# Patient Record
Sex: FEMALE | Race: BLACK OR AFRICAN AMERICAN | NOT HISPANIC OR LATINO | Employment: OTHER | URBAN - METROPOLITAN AREA
[De-identification: names, ages, dates, MRNs, and addresses within clinical notes are randomized per-mention and may not be internally consistent; named-entity substitution may affect disease eponyms.]

---

## 2020-03-31 ENCOUNTER — OFFICE VISIT (OUTPATIENT)
Dept: FAMILY MEDICINE | Facility: CLINIC | Age: 30
End: 2020-03-31
Payer: COMMERCIAL

## 2020-03-31 VITALS
SYSTOLIC BLOOD PRESSURE: 135 MMHG | HEART RATE: 93 BPM | TEMPERATURE: 99 F | HEIGHT: 64 IN | BODY MASS INDEX: 33.32 KG/M2 | WEIGHT: 195.19 LBS | RESPIRATION RATE: 18 BRPM | DIASTOLIC BLOOD PRESSURE: 85 MMHG | OXYGEN SATURATION: 99 %

## 2020-03-31 DIAGNOSIS — F41.9 ANXIETY AND DEPRESSION: Primary | ICD-10-CM

## 2020-03-31 DIAGNOSIS — F32.A ANXIETY AND DEPRESSION: Primary | ICD-10-CM

## 2020-03-31 DIAGNOSIS — G47.00 INSOMNIA, UNSPECIFIED TYPE: ICD-10-CM

## 2020-03-31 PROCEDURE — 3008F PR BODY MASS INDEX (BMI) DOCUMENTED: ICD-10-PCS | Mod: CPTII,S$GLB,, | Performed by: FAMILY MEDICINE

## 2020-03-31 PROCEDURE — 3008F BODY MASS INDEX DOCD: CPT | Mod: CPTII,S$GLB,, | Performed by: FAMILY MEDICINE

## 2020-03-31 PROCEDURE — 99999 PR PBB SHADOW E&M-EST. PATIENT-LVL III: ICD-10-PCS | Mod: PBBFAC,,, | Performed by: FAMILY MEDICINE

## 2020-03-31 PROCEDURE — 99203 OFFICE O/P NEW LOW 30 MIN: CPT | Mod: S$GLB,,, | Performed by: FAMILY MEDICINE

## 2020-03-31 PROCEDURE — 99203 PR OFFICE/OUTPT VISIT, NEW, LEVL III, 30-44 MIN: ICD-10-PCS | Mod: S$GLB,,, | Performed by: FAMILY MEDICINE

## 2020-03-31 PROCEDURE — 99999 PR PBB SHADOW E&M-EST. PATIENT-LVL III: CPT | Mod: PBBFAC,,, | Performed by: FAMILY MEDICINE

## 2020-03-31 RX ORDER — ESCITALOPRAM OXALATE 10 MG/1
10 TABLET ORAL DAILY
Qty: 30 TABLET | Refills: 2 | Status: SHIPPED | OUTPATIENT
Start: 2020-03-31 | End: 2021-02-09 | Stop reason: SDUPTHER

## 2020-03-31 RX ORDER — TRAZODONE HYDROCHLORIDE 50 MG/1
50 TABLET ORAL NIGHTLY PRN
Qty: 30 TABLET | Refills: 2 | Status: SHIPPED | OUTPATIENT
Start: 2020-03-31 | End: 2021-02-14

## 2020-03-31 NOTE — PROGRESS NOTES
Radames Cota    Chief Complaint   Patient presents with    Anxiety    Panic Attack       History of Present Illness:   Ms. Cota comes in today as a new patient to establish care with me and for evaluation of anxiety.  She states she has been previously followed by PCP Dr. Quiroga.    She states she has been having hard time dealing with anxiety since the end of 2020.  She states she has not been sleeping since then; she states she takes melatonin without help for sleep but also states it occasionally causes her to have headaches..  She reports having chest pain, headaches, tremors, nausea, dizziness, abdominal pain, diarrhea, diaphoresis sometimes with her anxiety.  She states she occasionally cries and does not know why.  She states she is easily agitated with her children.  She states over the past 6 months she states she pulls on her hair uncontrollably.  Otherwise, she denies having depression at this time, homicidal or suicidal thoughts, fever, chills, fatigue, appetite changes; shortness of breath, cough, wheezing; palpitations, leg swelling; vomiting, constipation; unusual urinary symptoms; back pain.  She denies tobacco, alcohol, or illicit drug use.    She states she has been dealing with anxiety since  at which time her 6-week old son  due to SIDS.  She states during that time she had 2-3 sessions of counseling.  She states she was treated with medication for anxiety and depression with help.    She states she has been treated with Trintellix in  and states it made her feel more depressed.  She states she was then tried on Xanax but states she did not like the way it made her feel.  She states she now takes stress relief gummy and uses home remedies without help for anxiety and depression.    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  She states she has 3 children- a 9-year-old son, 2 daughters, ages 7-rear old and 1-year-old.  She is single.    She states she was evaluated at  [facility] on January 19, 2020 for panic attack and states she was told she needs medication.  She had labs drawn at that time with TSH of 2.616 noted.    Past Medical History:  No date: Anxiety  No date: Contraceptive management      Comment:  On Depo Provera - [facility] by [facility]  No date: Depression  No date: Preeclampsia      Comment:  during 3rd and 4th pregnancies      No current outpatient medications on file.       Review of Systems   Constitutional: Negative for activity change, appetite change, chills, fatigue and fever.   Respiratory: Negative for cough, shortness of breath and wheezing.    Cardiovascular: Positive for chest pain and palpitations. Negative for leg swelling.   Gastrointestinal: Positive for abdominal pain, diarrhea and nausea. Negative for blood in stool, constipation and vomiting.   Genitourinary: Negative for difficulty urinating and menstrual problem.   Musculoskeletal: Negative for back pain.   Neurological: Positive for dizziness, light-headedness and headaches. Negative for seizures.   Psychiatric/Behavioral: Positive for agitation, dysphoric mood and sleep disturbance. Negative for hallucinations and suicidal ideas. The patient is nervous/anxious.         Negative for homicidal ideas.       Objective:  Physical Exam   Constitutional: She is oriented to person, place, and time. She appears well-developed and well-nourished. No distress.   Pleasant.   Neck: Normal range of motion. Neck supple. No thyromegaly present.   Cardiovascular: Normal rate, regular rhythm, normal heart sounds and intact distal pulses.   No murmur heard.  Pulmonary/Chest: Effort normal and breath sounds normal. No respiratory distress. She has no wheezes.   Abdominal: Soft. Bowel sounds are normal. She exhibits no distension and no mass. There is no tenderness. There is no guarding.   Musculoskeletal: Normal range of motion. She exhibits no edema or tenderness.   She is ambulatory without  problems.   Lymphadenopathy:     She has no cervical adenopathy.   Neurological: She is alert and oriented to person, place, and time.   Skin: She is not diaphoretic.   Psychiatric: She has a normal mood and affect. Her behavior is normal. Judgment and thought content normal.   Vitals reviewed.      ASSESSMENT:  1. Anxiety and depression    2. Insomnia, unspecified type        PLAN:  Radames was seen today for anxiety and panic attack.    Diagnoses and all orders for this visit:    Anxiety and depression  -     escitalopram oxalate (LEXAPRO) 10 MG tablet; Take 1 tablet (10 mg total) by mouth once daily.    Insomnia, unspecified type  -     traZODone (DESYREL) 50 MG tablet; Take 1 tablet (50 mg total) by mouth nightly as needed for Insomnia or Depression.       Add Lexapro 10 mg every morning; medication precautions discussed with patient.  Try Trazodone 50 mg nightly prn insomnia; medication precautions discussed with patient.  Follow low sodium, low cholesterol, low carb diet, daily walks.  Follow up in about 1 month (around 4/30/2020) for anxiety and depression follow up via virtual visit.   Total visit time 30 minutes with >50% of time spent in counseling and discussion regarding above issues.

## 2020-10-06 ENCOUNTER — PATIENT MESSAGE (OUTPATIENT)
Dept: ADMINISTRATIVE | Facility: HOSPITAL | Age: 30
End: 2020-10-06

## 2021-02-09 ENCOUNTER — PATIENT MESSAGE (OUTPATIENT)
Dept: FAMILY MEDICINE | Facility: CLINIC | Age: 31
End: 2021-02-09

## 2021-02-09 DIAGNOSIS — F32.A ANXIETY AND DEPRESSION: ICD-10-CM

## 2021-02-09 DIAGNOSIS — F41.9 ANXIETY AND DEPRESSION: ICD-10-CM

## 2021-02-10 RX ORDER — ESCITALOPRAM OXALATE 10 MG/1
10 TABLET ORAL DAILY
Qty: 30 TABLET | Refills: 1 | Status: SHIPPED | OUTPATIENT
Start: 2021-02-10 | End: 2021-02-14

## 2021-02-14 ENCOUNTER — PATIENT MESSAGE (OUTPATIENT)
Dept: FAMILY MEDICINE | Facility: CLINIC | Age: 31
End: 2021-02-14

## 2021-02-14 RX ORDER — FLUCONAZOLE 150 MG/1
150 TABLET ORAL ONCE
Qty: 1 TABLET | Refills: 0 | Status: SHIPPED | OUTPATIENT
Start: 2021-02-14 | End: 2021-02-14

## 2021-04-28 ENCOUNTER — PATIENT MESSAGE (OUTPATIENT)
Dept: RESEARCH | Facility: HOSPITAL | Age: 31
End: 2021-04-28

## 2021-05-31 ENCOUNTER — LAB VISIT (OUTPATIENT)
Dept: LAB | Facility: HOSPITAL | Age: 31
End: 2021-05-31
Attending: FAMILY MEDICINE
Payer: COMMERCIAL

## 2021-05-31 ENCOUNTER — OFFICE VISIT (OUTPATIENT)
Dept: INTERNAL MEDICINE | Facility: CLINIC | Age: 31
End: 2021-05-31
Payer: COMMERCIAL

## 2021-05-31 VITALS
BODY MASS INDEX: 34.06 KG/M2 | OXYGEN SATURATION: 98 % | DIASTOLIC BLOOD PRESSURE: 76 MMHG | HEIGHT: 63 IN | SYSTOLIC BLOOD PRESSURE: 124 MMHG | TEMPERATURE: 99 F | WEIGHT: 192.25 LBS | HEART RATE: 108 BPM | RESPIRATION RATE: 18 BRPM

## 2021-05-31 DIAGNOSIS — F32.A ANXIETY AND DEPRESSION: ICD-10-CM

## 2021-05-31 DIAGNOSIS — E66.9 OBESITY (BMI 30.0-34.9): ICD-10-CM

## 2021-05-31 DIAGNOSIS — F41.9 ANXIETY AND DEPRESSION: ICD-10-CM

## 2021-05-31 DIAGNOSIS — Z00.00 ANNUAL PHYSICAL EXAM: Primary | ICD-10-CM

## 2021-05-31 DIAGNOSIS — Z00.00 ANNUAL PHYSICAL EXAM: ICD-10-CM

## 2021-05-31 PROBLEM — E66.811 OBESITY (BMI 30.0-34.9): Status: ACTIVE | Noted: 2021-05-31

## 2021-05-31 LAB
ALBUMIN SERPL BCP-MCNC: 4.2 G/DL (ref 3.5–5.2)
ALP SERPL-CCNC: 57 U/L (ref 55–135)
ALT SERPL W/O P-5'-P-CCNC: 13 U/L (ref 10–44)
ANION GAP SERPL CALC-SCNC: 8 MMOL/L (ref 8–16)
AST SERPL-CCNC: 8 U/L (ref 10–40)
BASOPHILS # BLD AUTO: 0.04 K/UL (ref 0–0.2)
BASOPHILS NFR BLD: 0.6 % (ref 0–1.9)
BILIRUB SERPL-MCNC: 0.4 MG/DL (ref 0.1–1)
BUN SERPL-MCNC: 5 MG/DL (ref 6–20)
CALCIUM SERPL-MCNC: 9.6 MG/DL (ref 8.7–10.5)
CHLORIDE SERPL-SCNC: 105 MMOL/L (ref 95–110)
CHOLEST SERPL-MCNC: 193 MG/DL (ref 120–199)
CHOLEST/HDLC SERPL: 3.4 {RATIO} (ref 2–5)
CO2 SERPL-SCNC: 25 MMOL/L (ref 23–29)
CREAT SERPL-MCNC: 0.8 MG/DL (ref 0.5–1.4)
DIFFERENTIAL METHOD: ABNORMAL
EOSINOPHIL # BLD AUTO: 0.1 K/UL (ref 0–0.5)
EOSINOPHIL NFR BLD: 1.1 % (ref 0–8)
ERYTHROCYTE [DISTWIDTH] IN BLOOD BY AUTOMATED COUNT: 14 % (ref 11.5–14.5)
EST. GFR  (AFRICAN AMERICAN): >60 ML/MIN/1.73 M^2
EST. GFR  (NON AFRICAN AMERICAN): >60 ML/MIN/1.73 M^2
GLUCOSE SERPL-MCNC: 81 MG/DL (ref 70–110)
HCT VFR BLD AUTO: 43.6 % (ref 37–48.5)
HDLC SERPL-MCNC: 57 MG/DL (ref 40–75)
HDLC SERPL: 29.5 % (ref 20–50)
HGB BLD-MCNC: 13.6 G/DL (ref 12–16)
IMM GRANULOCYTES # BLD AUTO: 0.01 K/UL (ref 0–0.04)
IMM GRANULOCYTES NFR BLD AUTO: 0.2 % (ref 0–0.5)
LDLC SERPL CALC-MCNC: 124.4 MG/DL (ref 63–159)
LYMPHOCYTES # BLD AUTO: 2.5 K/UL (ref 1–4.8)
LYMPHOCYTES NFR BLD: 38.7 % (ref 18–48)
MCH RBC QN AUTO: 28.1 PG (ref 27–31)
MCHC RBC AUTO-ENTMCNC: 31.2 G/DL (ref 32–36)
MCV RBC AUTO: 90 FL (ref 82–98)
MONOCYTES # BLD AUTO: 0.7 K/UL (ref 0.3–1)
MONOCYTES NFR BLD: 10.5 % (ref 4–15)
NEUTROPHILS # BLD AUTO: 3.1 K/UL (ref 1.8–7.7)
NEUTROPHILS NFR BLD: 48.9 % (ref 38–73)
NONHDLC SERPL-MCNC: 136 MG/DL
NRBC BLD-RTO: 0 /100 WBC
PLATELET # BLD AUTO: 225 K/UL (ref 150–450)
PMV BLD AUTO: 12.6 FL (ref 9.2–12.9)
POTASSIUM SERPL-SCNC: 3.8 MMOL/L (ref 3.5–5.1)
PROT SERPL-MCNC: 7.8 G/DL (ref 6–8.4)
RBC # BLD AUTO: 4.84 M/UL (ref 4–5.4)
SODIUM SERPL-SCNC: 138 MMOL/L (ref 136–145)
TRIGL SERPL-MCNC: 58 MG/DL (ref 30–150)
TSH SERPL DL<=0.005 MIU/L-ACNC: 0.56 UIU/ML (ref 0.4–4)
WBC # BLD AUTO: 6.36 K/UL (ref 3.9–12.7)

## 2021-05-31 PROCEDURE — 1125F PR PAIN SEVERITY QUANTIFIED, PAIN PRESENT: ICD-10-PCS | Mod: S$GLB,,, | Performed by: FAMILY MEDICINE

## 2021-05-31 PROCEDURE — 84443 ASSAY THYROID STIM HORMONE: CPT | Performed by: FAMILY MEDICINE

## 2021-05-31 PROCEDURE — 99999 PR PBB SHADOW E&M-EST. PATIENT-LVL IV: CPT | Mod: PBBFAC,,, | Performed by: FAMILY MEDICINE

## 2021-05-31 PROCEDURE — 80053 COMPREHEN METABOLIC PANEL: CPT | Performed by: FAMILY MEDICINE

## 2021-05-31 PROCEDURE — 3008F BODY MASS INDEX DOCD: CPT | Mod: CPTII,S$GLB,, | Performed by: FAMILY MEDICINE

## 2021-05-31 PROCEDURE — 86703 HIV-1/HIV-2 1 RESULT ANTBDY: CPT | Performed by: FAMILY MEDICINE

## 2021-05-31 PROCEDURE — 85025 COMPLETE CBC W/AUTO DIFF WBC: CPT | Performed by: FAMILY MEDICINE

## 2021-05-31 PROCEDURE — 3008F PR BODY MASS INDEX (BMI) DOCUMENTED: ICD-10-PCS | Mod: CPTII,S$GLB,, | Performed by: FAMILY MEDICINE

## 2021-05-31 PROCEDURE — 99999 PR PBB SHADOW E&M-EST. PATIENT-LVL IV: ICD-10-PCS | Mod: PBBFAC,,, | Performed by: FAMILY MEDICINE

## 2021-05-31 PROCEDURE — 86803 HEPATITIS C AB TEST: CPT | Performed by: FAMILY MEDICINE

## 2021-05-31 PROCEDURE — 99395 PR PREVENTIVE VISIT,EST,18-39: ICD-10-PCS | Mod: S$GLB,,, | Performed by: FAMILY MEDICINE

## 2021-05-31 PROCEDURE — 1125F AMNT PAIN NOTED PAIN PRSNT: CPT | Mod: S$GLB,,, | Performed by: FAMILY MEDICINE

## 2021-05-31 PROCEDURE — 99395 PREV VISIT EST AGE 18-39: CPT | Mod: S$GLB,,, | Performed by: FAMILY MEDICINE

## 2021-05-31 PROCEDURE — 36415 COLL VENOUS BLD VENIPUNCTURE: CPT | Performed by: FAMILY MEDICINE

## 2021-05-31 PROCEDURE — 80061 LIPID PANEL: CPT | Performed by: FAMILY MEDICINE

## 2021-05-31 RX ORDER — CETIRIZINE HYDROCHLORIDE 10 MG/1
10 TABLET ORAL DAILY PRN
COMMUNITY
Start: 2021-01-20 | End: 2021-12-02

## 2021-05-31 RX ORDER — SPIRONOLACTONE 100 MG/1
100 TABLET, FILM COATED ORAL DAILY
COMMUNITY
Start: 2021-01-07 | End: 2021-12-02

## 2021-05-31 RX ORDER — SPIRONOLACTONE 100 MG/1
100 TABLET, FILM COATED ORAL EVERY MORNING
COMMUNITY
Start: 2021-04-19 | End: 2021-05-31 | Stop reason: SDUPTHER

## 2021-05-31 RX ORDER — ESCITALOPRAM OXALATE 10 MG/1
TABLET ORAL
COMMUNITY
Start: 2021-02-10 | End: 2021-05-31 | Stop reason: SDUPTHER

## 2021-05-31 RX ORDER — ESCITALOPRAM OXALATE 10 MG/1
10 TABLET ORAL DAILY
Qty: 30 TABLET | Refills: 5 | Status: SHIPPED | OUTPATIENT
Start: 2021-05-31 | End: 2021-12-02 | Stop reason: ALTCHOICE

## 2021-06-01 ENCOUNTER — IMMUNIZATION (OUTPATIENT)
Dept: INTERNAL MEDICINE | Facility: CLINIC | Age: 31
End: 2021-06-01
Payer: MEDICARE

## 2021-06-01 DIAGNOSIS — Z23 NEED FOR VACCINATION: Primary | ICD-10-CM

## 2021-06-01 LAB
HCV AB SERPL QL IA: NEGATIVE
HIV 1+2 AB+HIV1 P24 AG SERPL QL IA: NEGATIVE

## 2021-06-01 PROCEDURE — 91300 COVID-19, MRNA, LNP-S, PF, 30 MCG/0.3 ML DOSE VACCINE: CPT | Mod: PBBFAC | Performed by: FAMILY MEDICINE

## 2021-06-02 ENCOUNTER — PATIENT OUTREACH (OUTPATIENT)
Dept: ADMINISTRATIVE | Facility: HOSPITAL | Age: 31
End: 2021-06-02

## 2021-07-01 ENCOUNTER — OFFICE VISIT (OUTPATIENT)
Dept: OPHTHALMOLOGY | Facility: CLINIC | Age: 31
End: 2021-07-01
Payer: MEDICARE

## 2021-07-01 DIAGNOSIS — H52.7 REFRACTIVE ERRORS: ICD-10-CM

## 2021-07-01 DIAGNOSIS — Z01.00 ENCOUNTER FOR EYE EXAM: Primary | ICD-10-CM

## 2021-07-01 PROCEDURE — 92004 PR EYE EXAM, NEW PATIENT,COMPREHESV: ICD-10-PCS | Mod: S$GLB,,, | Performed by: OPTOMETRIST

## 2021-07-01 PROCEDURE — 99999 PR PBB SHADOW E&M-EST. PATIENT-LVL II: CPT | Mod: PBBFAC,,, | Performed by: OPTOMETRIST

## 2021-07-01 PROCEDURE — 92004 COMPRE OPH EXAM NEW PT 1/>: CPT | Mod: S$GLB,,, | Performed by: OPTOMETRIST

## 2021-07-01 PROCEDURE — 92015 PR REFRACTION: ICD-10-PCS | Mod: S$GLB,,, | Performed by: OPTOMETRIST

## 2021-07-01 PROCEDURE — 1126F PR PAIN SEVERITY QUANTIFIED, NO PAIN PRESENT: ICD-10-PCS | Mod: S$GLB,,, | Performed by: OPTOMETRIST

## 2021-07-01 PROCEDURE — 1126F AMNT PAIN NOTED NONE PRSNT: CPT | Mod: S$GLB,,, | Performed by: OPTOMETRIST

## 2021-07-01 PROCEDURE — 92015 DETERMINE REFRACTIVE STATE: CPT | Mod: S$GLB,,, | Performed by: OPTOMETRIST

## 2021-07-01 PROCEDURE — 99999 PR PBB SHADOW E&M-EST. PATIENT-LVL II: ICD-10-PCS | Mod: PBBFAC,,, | Performed by: OPTOMETRIST

## 2021-07-02 ENCOUNTER — IMMUNIZATION (OUTPATIENT)
Dept: INTERNAL MEDICINE | Facility: CLINIC | Age: 31
End: 2021-07-02
Payer: MEDICARE

## 2021-07-02 DIAGNOSIS — Z23 NEED FOR VACCINATION: Primary | ICD-10-CM

## 2021-07-02 PROCEDURE — 0002A COVID-19, MRNA, LNP-S, PF, 30 MCG/0.3 ML DOSE VACCINE: CPT | Mod: PBBFAC | Performed by: FAMILY MEDICINE

## 2021-07-02 PROCEDURE — 91300 COVID-19, MRNA, LNP-S, PF, 30 MCG/0.3 ML DOSE VACCINE: CPT | Mod: PBBFAC | Performed by: FAMILY MEDICINE

## 2021-11-17 ENCOUNTER — PATIENT MESSAGE (OUTPATIENT)
Dept: FAMILY MEDICINE | Facility: CLINIC | Age: 31
End: 2021-11-17
Payer: MEDICAID

## 2021-11-17 ENCOUNTER — TELEPHONE (OUTPATIENT)
Dept: FAMILY MEDICINE | Facility: CLINIC | Age: 31
End: 2021-11-17
Payer: MEDICAID

## 2021-11-17 DIAGNOSIS — B37.9 YEAST INFECTION: Primary | ICD-10-CM

## 2021-11-17 RX ORDER — FLUCONAZOLE 150 MG/1
150 TABLET ORAL DAILY
Qty: 1 TABLET | Refills: 0 | Status: SHIPPED | OUTPATIENT
Start: 2021-11-17 | End: 2021-11-18

## 2021-12-02 ENCOUNTER — OFFICE VISIT (OUTPATIENT)
Dept: FAMILY MEDICINE | Facility: CLINIC | Age: 31
End: 2021-12-02
Payer: MEDICARE

## 2021-12-02 VITALS
HEART RATE: 80 BPM | DIASTOLIC BLOOD PRESSURE: 62 MMHG | BODY MASS INDEX: 34.45 KG/M2 | SYSTOLIC BLOOD PRESSURE: 116 MMHG | WEIGHT: 194.44 LBS | HEIGHT: 63 IN | TEMPERATURE: 99 F | OXYGEN SATURATION: 98 %

## 2021-12-02 DIAGNOSIS — F41.9 ANXIETY AND DEPRESSION: Primary | ICD-10-CM

## 2021-12-02 DIAGNOSIS — F32.A ANXIETY AND DEPRESSION: Primary | ICD-10-CM

## 2021-12-02 PROCEDURE — 99999 PR PBB SHADOW E&M-EST. PATIENT-LVL III: CPT | Mod: PBBFAC,,, | Performed by: FAMILY MEDICINE

## 2021-12-02 PROCEDURE — 99213 PR OFFICE/OUTPT VISIT, EST, LEVL III, 20-29 MIN: ICD-10-PCS | Mod: S$GLB,,, | Performed by: FAMILY MEDICINE

## 2021-12-02 PROCEDURE — 99213 OFFICE O/P EST LOW 20 MIN: CPT | Mod: S$GLB,,, | Performed by: FAMILY MEDICINE

## 2021-12-02 PROCEDURE — 99999 PR PBB SHADOW E&M-EST. PATIENT-LVL III: ICD-10-PCS | Mod: PBBFAC,,, | Performed by: FAMILY MEDICINE

## 2021-12-02 RX ORDER — VENLAFAXINE HYDROCHLORIDE 75 MG/1
75 CAPSULE, EXTENDED RELEASE ORAL DAILY
Qty: 30 CAPSULE | Refills: 5 | Status: SHIPPED | OUTPATIENT
Start: 2021-12-02 | End: 2022-04-26 | Stop reason: DRUGHIGH

## 2021-12-02 RX ORDER — VENLAFAXINE HYDROCHLORIDE 37.5 MG/1
37.5 CAPSULE, EXTENDED RELEASE ORAL DAILY
Qty: 7 CAPSULE | Refills: 0 | Status: SHIPPED | OUTPATIENT
Start: 2021-12-02 | End: 2021-12-09

## 2021-12-27 ENCOUNTER — TELEPHONE (OUTPATIENT)
Dept: FAMILY MEDICINE | Facility: CLINIC | Age: 31
End: 2021-12-27
Payer: MEDICAID

## 2022-01-13 ENCOUNTER — PATIENT MESSAGE (OUTPATIENT)
Dept: FAMILY MEDICINE | Facility: CLINIC | Age: 32
End: 2022-01-13
Payer: MEDICAID

## 2022-01-13 DIAGNOSIS — F32.A ANXIETY AND DEPRESSION: Primary | ICD-10-CM

## 2022-01-13 DIAGNOSIS — F41.9 ANXIETY AND DEPRESSION: Primary | ICD-10-CM

## 2022-01-21 NOTE — TELEPHONE ENCOUNTER
Referral in for [facility]counsel[facility]. Advise patient. Thanks.      Orders Placed This Encounter   Procedures    Ambulatory referral/consult to Psychology     Standing Status:   Future     Standing Expiration Date:   2/21/2023     Referral Priority:   Routine     Referral Type:   Psychiatric     Referral Reason:   Specialty Services Required     Requested Specialty:   Psychology     Number of Visits Requested:   1

## 2022-04-17 ENCOUNTER — PATIENT MESSAGE (OUTPATIENT)
Dept: FAMILY MEDICINE | Facility: CLINIC | Age: 32
End: 2022-04-17
Payer: MEDICAID

## 2022-04-25 ENCOUNTER — TELEPHONE (OUTPATIENT)
Dept: FAMILY MEDICINE | Facility: CLINIC | Age: 32
End: 2022-04-25
Payer: MEDICAID

## 2022-04-25 ENCOUNTER — PATIENT MESSAGE (OUTPATIENT)
Dept: FAMILY MEDICINE | Facility: CLINIC | Age: 32
End: 2022-04-25
Payer: MEDICAID

## 2022-04-26 ENCOUNTER — OFFICE VISIT (OUTPATIENT)
Dept: PSYCHIATRY | Facility: CLINIC | Age: 32
End: 2022-04-26
Payer: MEDICAID

## 2022-04-26 ENCOUNTER — TELEPHONE (OUTPATIENT)
Dept: FAMILY MEDICINE | Facility: CLINIC | Age: 32
End: 2022-04-26
Payer: MEDICAID

## 2022-04-26 ENCOUNTER — PATIENT MESSAGE (OUTPATIENT)
Dept: FAMILY MEDICINE | Facility: CLINIC | Age: 32
End: 2022-04-26
Payer: MEDICAID

## 2022-04-26 DIAGNOSIS — F41.0 PANIC DISORDER: ICD-10-CM

## 2022-04-26 DIAGNOSIS — F32.2 CURRENT SEVERE EPISODE OF MAJOR DEPRESSIVE DISORDER WITHOUT PSYCHOTIC FEATURES WITHOUT PRIOR EPISODE: Primary | ICD-10-CM

## 2022-04-26 DIAGNOSIS — F43.10 PTSD (POST-TRAUMATIC STRESS DISORDER): ICD-10-CM

## 2022-04-26 DIAGNOSIS — F41.9 ANXIETY AND DEPRESSION: Primary | ICD-10-CM

## 2022-04-26 DIAGNOSIS — F32.A ANXIETY AND DEPRESSION: Primary | ICD-10-CM

## 2022-04-26 PROCEDURE — 90791 PR PSYCHIATRIC DIAGNOSTIC EVALUATION: ICD-10-PCS | Mod: AJ,HB,, | Performed by: SOCIAL WORKER

## 2022-04-26 PROCEDURE — 99999 PR PBB SHADOW E&M-EST. PATIENT-LVL II: CPT | Mod: PBBFAC,AJ,HB, | Performed by: SOCIAL WORKER

## 2022-04-26 PROCEDURE — 90791 PSYCH DIAGNOSTIC EVALUATION: CPT | Mod: AJ,HB,, | Performed by: SOCIAL WORKER

## 2022-04-26 PROCEDURE — 99212 OFFICE O/P EST SF 10 MIN: CPT | Mod: PBBFAC | Performed by: SOCIAL WORKER

## 2022-04-26 PROCEDURE — 99999 PR PBB SHADOW E&M-EST. PATIENT-LVL II: ICD-10-PCS | Mod: PBBFAC,AJ,HB, | Performed by: SOCIAL WORKER

## 2022-04-26 PROCEDURE — 1159F PR MEDICATION LIST DOCUMENTED IN MEDICAL RECORD: ICD-10-PCS | Mod: AJ,HB,CPTII, | Performed by: SOCIAL WORKER

## 2022-04-26 PROCEDURE — 1159F MED LIST DOCD IN RCRD: CPT | Mod: AJ,HB,CPTII, | Performed by: SOCIAL WORKER

## 2022-04-26 RX ORDER — VENLAFAXINE HYDROCHLORIDE 150 MG/1
150 CAPSULE, EXTENDED RELEASE ORAL DAILY
Qty: 30 CAPSULE | Refills: 0 | Status: SHIPPED | OUTPATIENT
Start: 2022-04-26 | End: 2022-05-31 | Stop reason: SDUPTHER

## 2022-04-26 NOTE — TELEPHONE ENCOUNTER
"Pt portal message    "    Hello,  Today I met with the counselor that you referred me to. After talking with her, she wanted me to ask you about an increase in the dosage of my current medication and if that was something you would approve of.     Thanks in Advance!  Pt would like increase on effexor  Please advise  "
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
## 2022-05-02 ENCOUNTER — PATIENT MESSAGE (OUTPATIENT)
Dept: ADMINISTRATIVE | Facility: HOSPITAL | Age: 32
End: 2022-05-02
Payer: MEDICAID

## 2022-05-03 ENCOUNTER — PATIENT MESSAGE (OUTPATIENT)
Dept: ADMINISTRATIVE | Facility: HOSPITAL | Age: 32
End: 2022-05-03
Payer: MEDICAID

## 2022-05-31 ENCOUNTER — OFFICE VISIT (OUTPATIENT)
Dept: FAMILY MEDICINE | Facility: CLINIC | Age: 32
End: 2022-05-31
Payer: MEDICAID

## 2022-05-31 VITALS
BODY MASS INDEX: 37.11 KG/M2 | WEIGHT: 209.44 LBS | DIASTOLIC BLOOD PRESSURE: 60 MMHG | HEIGHT: 63 IN | SYSTOLIC BLOOD PRESSURE: 120 MMHG | HEART RATE: 100 BPM | OXYGEN SATURATION: 99 % | TEMPERATURE: 97 F

## 2022-05-31 DIAGNOSIS — F41.9 ANXIETY AND DEPRESSION: ICD-10-CM

## 2022-05-31 DIAGNOSIS — F32.A ANXIETY AND DEPRESSION: ICD-10-CM

## 2022-05-31 DIAGNOSIS — Z00.00 ANNUAL PHYSICAL EXAM: Primary | ICD-10-CM

## 2022-05-31 DIAGNOSIS — E66.01 SEVERE OBESITY (BMI 35.0-39.9) WITH COMORBIDITY: ICD-10-CM

## 2022-05-31 PROCEDURE — 99999 PR PBB SHADOW E&M-EST. PATIENT-LVL IV: CPT | Mod: PBBFAC,,, | Performed by: FAMILY MEDICINE

## 2022-05-31 PROCEDURE — 1159F MED LIST DOCD IN RCRD: CPT | Mod: CPTII,,, | Performed by: FAMILY MEDICINE

## 2022-05-31 PROCEDURE — 3078F PR MOST RECENT DIASTOLIC BLOOD PRESSURE < 80 MM HG: ICD-10-PCS | Mod: CPTII,,, | Performed by: FAMILY MEDICINE

## 2022-05-31 PROCEDURE — 3074F SYST BP LT 130 MM HG: CPT | Mod: CPTII,,, | Performed by: FAMILY MEDICINE

## 2022-05-31 PROCEDURE — 1159F PR MEDICATION LIST DOCUMENTED IN MEDICAL RECORD: ICD-10-PCS | Mod: CPTII,,, | Performed by: FAMILY MEDICINE

## 2022-05-31 PROCEDURE — 3008F PR BODY MASS INDEX (BMI) DOCUMENTED: ICD-10-PCS | Mod: CPTII,,, | Performed by: FAMILY MEDICINE

## 2022-05-31 PROCEDURE — 99999 PR PBB SHADOW E&M-EST. PATIENT-LVL IV: ICD-10-PCS | Mod: PBBFAC,,, | Performed by: FAMILY MEDICINE

## 2022-05-31 PROCEDURE — 99395 PREV VISIT EST AGE 18-39: CPT | Mod: S$PBB,,, | Performed by: FAMILY MEDICINE

## 2022-05-31 PROCEDURE — 1160F RVW MEDS BY RX/DR IN RCRD: CPT | Mod: CPTII,,, | Performed by: FAMILY MEDICINE

## 2022-05-31 PROCEDURE — 1160F PR REVIEW ALL MEDS BY PRESCRIBER/CLIN PHARMACIST DOCUMENTED: ICD-10-PCS | Mod: CPTII,,, | Performed by: FAMILY MEDICINE

## 2022-05-31 PROCEDURE — 99395 PR PREVENTIVE VISIT,EST,18-39: ICD-10-PCS | Mod: S$PBB,,, | Performed by: FAMILY MEDICINE

## 2022-05-31 PROCEDURE — 3008F BODY MASS INDEX DOCD: CPT | Mod: CPTII,,, | Performed by: FAMILY MEDICINE

## 2022-05-31 PROCEDURE — 3078F DIAST BP <80 MM HG: CPT | Mod: CPTII,,, | Performed by: FAMILY MEDICINE

## 2022-05-31 PROCEDURE — 3074F PR MOST RECENT SYSTOLIC BLOOD PRESSURE < 130 MM HG: ICD-10-PCS | Mod: CPTII,,, | Performed by: FAMILY MEDICINE

## 2022-05-31 PROCEDURE — 99214 OFFICE O/P EST MOD 30 MIN: CPT | Mod: PBBFAC,PO | Performed by: FAMILY MEDICINE

## 2022-05-31 RX ORDER — VENLAFAXINE HYDROCHLORIDE 150 MG/1
150 CAPSULE, EXTENDED RELEASE ORAL DAILY
Qty: 90 CAPSULE | Refills: 1 | Status: SHIPPED | OUTPATIENT
Start: 2022-05-31 | End: 2022-10-19 | Stop reason: ALTCHOICE

## 2022-05-31 NOTE — PROGRESS NOTES
HISTORY OF PRESENT ILLNESS:  Ms. Cota comes in today for annual wellness examination.  She is not fasting and has taken medications today.     END OF LIFE DECISION: She does not have a living will. She does desire life support.    .  Current Outpatient Medications   Medication Sig    venlafaxine (EFFEXOR-XR) 150 MG Cp24 Take 1 capsule (150 mg total) by mouth once daily.      SCREENINGS:    LDLCALC: May 31, 2021.   Colonoscopy: Never.  Mammogram: Never.  Eye Exam: July 1, 2021 with Dr. Kaur. She wears glasses.  GYN Exam:  [facility] with [facility] - okay per patient. Scheduled with [facility] on September 26, 2022.  PPD: Never.        Immunization History   Administered Date(s) Administered    COVID-19, MRNA, LN-S, PF (Pfizer) (Purple Cap) 06/01/2021, 07/02/2021    MMR 02/14/1994, 12/12/1996    Meningococcal Conjugate (MCV4P) 03/18/2010    Tdap 03/18/2010, 03/05/2015, 07/30/2018   Flu shot: In the past per patient.                                            ROS:  GENERAL: Denies fever, chills, fatigue or unusual weight change. Appetite normal. Exercises does not. Monitors diet does not. Weight 88.2 kg (194 lb 7.1 oz) at December 2, 2021 visit  SKIN: Denies rashes, itching, changes in mole, color or texture of skin or easy bruising.  HEAD:  Denies recent head trauma or headaches.  EYES: Denies change in vision, pain, diplopia, redness or discharge.  EARS: Denies ear pain, discharge, vertigo or decreased hearing.  NOSE: Denies loss of smell, epistaxis or rhinitis.  MOUTH & THROAT: Denies hoarseness or change in voice. Denies excessive gum bleeding or mouth sores.  Denies sore throat.  NODES: Denies swollen glands.  CHEST: Denies MCQUEEN, wheezing, cough, or sputum production.  CARDIOVASCULAR: Denies chest pain, PND, orthopnea or reduced exercise tolerance.  Denies palpitations.  ABDOMEN: Denies diarrhea, constipation, nausea, vomiting, abdominal pain, or blood in stool.  URINARY: Denies  "flank pain, dysuria or hematuria.  GENITOURINARY: Denies flank pain, dysuria, frequency or hematuria. No change in menses. Performs monthly breast exams does not.  ENDOCRINE: Denies diabetes, thyroid, cholesterol problems. Performs home glucose checks N./A. Reports sweats and eats a lot for few weeks.  HEME/LYMPH: Denies bleeding problems.  PERIPHERAL VASCULAR:Denies claudication or cyanosis.  MUSCULOSKELETAL: Denies joint stiffness, pain or swelling. Denies edema. Reports previous PCP advised she has CTS and cubital tunnel syndrome with treatment with steroid. Reports no symptoms unless overuse of hands.   NEUROLOGIC: Denies history of seizures, tremors, paralysis, alteration of gait or coordination.  PSYCHIATRIC: Denies mood swings, controlled anxiety and depression with taking Effexor-XR, homicidal or suicidal thoughts. Follows with Lexi Ace LCSW with last visit on April 26, 2022 with follow up scheduled for June 7, 2022 and also with Dr. Gutierrez on June 29, 2022. Denies sleep problems.     PE:   VS: Blood Pressure 120/60   Pulse 100   Temperature 96.5 °F (35.8 °C)   Height 5' 3" (1.6 m)   Weight 95 kg (209 lb 7 oz)   Last Menstrual Period 05/31/2022 Comment: monthly  Oxygen Saturation 99%   Body Mass Index 37.10 kg/m²   APPEARANCE:  Well nourished, well developed female, pleasant and obese, alert and oriented in no acute distress.    HEAD: Nontender. Full range of motion.  EYES: PERRL, conjunctiva pink, lids no edema.   EARS: External canal patent, no swelling or redness. TM's shiny and clear.  NOSE: Mucosa and turbinates pink, not swollen. No discharge. Nontender sinuses.  THROAT: No pharyngeal erythema or exudate. No stridor.   NECK: Supple, no mass, thyroid not enlarged.  NODES: No cervical lymph node enlargement.  CHEST: Normal respiratory effort. Lungs clear to auscultation.  CARDIOVASCULAR: Normal S1, S2. No rubs, murmurs or gallops. PMI not displaced. No carotid bruit. Pedal pulses " palpable bilaterally. No edema.  ABDOMEN: Bowel sounds present. Not distended. Soft. No tenderness, masses or organomegaly.  BREAST EXAM: Not examined.  PELVIC EXAM: Not examined.  RECTAL EXAM: Not examined.  MUSCULOSKELETAL: No joint deformities or stiffness. She is ambulatory without problems.  SKIN: No rashes or suspicious lesions, normal color and turgor.  NEUROLOGIC:   Cranial Nerves: II-XII grossly intact.   DTR's: Knees, Ankles 2+ and equal bilaterally. Gait & Posture: Normal gait and fine motion.  PSYCHIATRIC:Patient alert, oriented x 3. Mood/Affect normal without acute anxiety or depression noted. Judgment/insight good as she makes appropriate decisions during today's exam.    ASSESSMENT:    ICD-10-CM ICD-9-CM    1. Annual physical exam  Z00.00 V70.0 Hemoglobin A1C      CBC Auto Differential      TSH      Comprehensive Metabolic Panel      Lipid Panel   2. Anxiety and depression  F41.9 300.00 venlafaxine (EFFEXOR-XR) 150 MG Cp24    F32.A 311    3. Severe obesity (BMI 35.0-39.9) with comorbidity  E66.01 278.01        PLAN:  1. Age-appropriate counseling-appropriate low-sodium, low-cholesterol, low carbohydrate diet and exercise daily, monthly breast self exam, annual wellness examination.   2. Patient advised to call for results.  3. Continue current medications.  4. Prescription refill as noted above.  5. Keep follow up with specialists.  6. Follow up in about 6 months (around 11/30/2022) for anxiety and depression follow up.

## 2022-06-01 PROBLEM — E66.01 SEVERE OBESITY (BMI 35.0-39.9) WITH COMORBIDITY: Status: ACTIVE | Noted: 2022-06-01

## 2022-06-03 ENCOUNTER — LAB VISIT (OUTPATIENT)
Dept: LAB | Facility: HOSPITAL | Age: 32
End: 2022-06-03
Attending: FAMILY MEDICINE
Payer: MEDICAID

## 2022-06-03 DIAGNOSIS — Z00.00 ANNUAL PHYSICAL EXAM: ICD-10-CM

## 2022-06-03 LAB
ALBUMIN SERPL BCP-MCNC: 4.2 G/DL (ref 3.5–5.2)
ALP SERPL-CCNC: 56 U/L (ref 55–135)
ALT SERPL W/O P-5'-P-CCNC: 16 U/L (ref 10–44)
ANION GAP SERPL CALC-SCNC: 14 MMOL/L (ref 8–16)
AST SERPL-CCNC: 11 U/L (ref 10–40)
BASOPHILS # BLD AUTO: 0.05 K/UL (ref 0–0.2)
BASOPHILS NFR BLD: 0.9 % (ref 0–1.9)
BILIRUB SERPL-MCNC: 0.5 MG/DL (ref 0.1–1)
BUN SERPL-MCNC: 5 MG/DL (ref 6–20)
CALCIUM SERPL-MCNC: 9.3 MG/DL (ref 8.7–10.5)
CHLORIDE SERPL-SCNC: 106 MMOL/L (ref 95–110)
CHOLEST SERPL-MCNC: 211 MG/DL (ref 120–199)
CHOLEST/HDLC SERPL: 4.1 {RATIO} (ref 2–5)
CO2 SERPL-SCNC: 22 MMOL/L (ref 23–29)
CREAT SERPL-MCNC: 0.8 MG/DL (ref 0.5–1.4)
DIFFERENTIAL METHOD: ABNORMAL
EOSINOPHIL # BLD AUTO: 0.1 K/UL (ref 0–0.5)
EOSINOPHIL NFR BLD: 0.9 % (ref 0–8)
ERYTHROCYTE [DISTWIDTH] IN BLOOD BY AUTOMATED COUNT: 14.3 % (ref 11.5–14.5)
EST. GFR  (AFRICAN AMERICAN): >60 ML/MIN/1.73 M^2
EST. GFR  (NON AFRICAN AMERICAN): >60 ML/MIN/1.73 M^2
ESTIMATED AVG GLUCOSE: 105 MG/DL (ref 68–131)
GLUCOSE SERPL-MCNC: 78 MG/DL (ref 70–110)
HBA1C MFR BLD: 5.3 % (ref 4–5.6)
HCT VFR BLD AUTO: 45 % (ref 37–48.5)
HDLC SERPL-MCNC: 52 MG/DL (ref 40–75)
HDLC SERPL: 24.6 % (ref 20–50)
HGB BLD-MCNC: 14.2 G/DL (ref 12–16)
IMM GRANULOCYTES # BLD AUTO: 0.01 K/UL (ref 0–0.04)
IMM GRANULOCYTES NFR BLD AUTO: 0.2 % (ref 0–0.5)
LDLC SERPL CALC-MCNC: 147.4 MG/DL (ref 63–159)
LYMPHOCYTES # BLD AUTO: 2.5 K/UL (ref 1–4.8)
LYMPHOCYTES NFR BLD: 41.9 % (ref 18–48)
MCH RBC QN AUTO: 28.2 PG (ref 27–31)
MCHC RBC AUTO-ENTMCNC: 31.6 G/DL (ref 32–36)
MCV RBC AUTO: 89 FL (ref 82–98)
MONOCYTES # BLD AUTO: 0.5 K/UL (ref 0.3–1)
MONOCYTES NFR BLD: 8.5 % (ref 4–15)
NEUTROPHILS # BLD AUTO: 2.8 K/UL (ref 1.8–7.7)
NEUTROPHILS NFR BLD: 47.6 % (ref 38–73)
NONHDLC SERPL-MCNC: 159 MG/DL
NRBC BLD-RTO: 0 /100 WBC
PLATELET # BLD AUTO: 254 K/UL (ref 150–450)
PMV BLD AUTO: 12.5 FL (ref 9.2–12.9)
POTASSIUM SERPL-SCNC: 4.4 MMOL/L (ref 3.5–5.1)
PROT SERPL-MCNC: 7.8 G/DL (ref 6–8.4)
RBC # BLD AUTO: 5.04 M/UL (ref 4–5.4)
SODIUM SERPL-SCNC: 142 MMOL/L (ref 136–145)
TRIGL SERPL-MCNC: 58 MG/DL (ref 30–150)
TSH SERPL DL<=0.005 MIU/L-ACNC: 0.88 UIU/ML (ref 0.4–4)
WBC # BLD AUTO: 5.87 K/UL (ref 3.9–12.7)

## 2022-06-03 PROCEDURE — 80061 LIPID PANEL: CPT | Performed by: FAMILY MEDICINE

## 2022-06-03 PROCEDURE — 84443 ASSAY THYROID STIM HORMONE: CPT | Performed by: FAMILY MEDICINE

## 2022-06-03 PROCEDURE — 85025 COMPLETE CBC W/AUTO DIFF WBC: CPT | Performed by: FAMILY MEDICINE

## 2022-06-03 PROCEDURE — 36415 COLL VENOUS BLD VENIPUNCTURE: CPT | Mod: PO | Performed by: FAMILY MEDICINE

## 2022-06-03 PROCEDURE — 80053 COMPREHEN METABOLIC PANEL: CPT | Performed by: FAMILY MEDICINE

## 2022-06-03 PROCEDURE — 83036 HEMOGLOBIN GLYCOSYLATED A1C: CPT | Performed by: FAMILY MEDICINE

## 2022-07-20 ENCOUNTER — PATIENT MESSAGE (OUTPATIENT)
Dept: FAMILY MEDICINE | Facility: CLINIC | Age: 32
End: 2022-07-20
Payer: MEDICAID

## 2022-07-21 ENCOUNTER — TELEPHONE (OUTPATIENT)
Dept: FAMILY MEDICINE | Facility: CLINIC | Age: 32
End: 2022-07-21
Payer: MEDICAID

## 2022-07-21 DIAGNOSIS — M79.643 PAIN OF HAND, UNSPECIFIED LATERALITY: Primary | ICD-10-CM

## 2022-07-21 NOTE — TELEPHONE ENCOUNTER
"Pt portal message    "Hello!     Mary been experiencing some problems with my carpal tunnel the past two weeks. Today I woke up to my right hand being extremely swollen, pained  and numb. I took pictures of it also. Mary tried ice, ibuprofen, etc and nothing is helping.  Thanks!"    Please advise and view picture attached in portal  "

## 2022-07-25 NOTE — TELEPHONE ENCOUNTER
Please check on patient to see how symptoms are now with taking medication for several days. Thanks.

## 2022-07-25 NOTE — TELEPHONE ENCOUNTER
The pt states the swelling has went down but she still have the numbing and tingling feelings in your finger tips.

## 2022-07-28 RX ORDER — METHYLPREDNISOLONE 4 MG/1
TABLET ORAL
Qty: 1 EACH | Refills: 0 | Status: SHIPPED | OUTPATIENT
Start: 2022-07-28 | End: 2022-09-26

## 2022-07-28 NOTE — TELEPHONE ENCOUNTER
I don't diagnoses of carpal tunnel syndrome on chart; however, if not pregnant, advise patient can try steroid dose pack to see if helps.  If not helpful, need appt. Thanks.    I have put the following orders and/or medications to this note.  Please advise pt and assist.    No orders of the defined types were placed in this encounter.      Medications Ordered This Encounter   Medications    methylPREDNISolone (MEDROL DOSEPACK) 4 mg tablet     Sig: use as directed     Dispense:  1 each     Refill:  0

## 2022-09-26 ENCOUNTER — OFFICE VISIT (OUTPATIENT)
Dept: OBSTETRICS AND GYNECOLOGY | Facility: CLINIC | Age: 32
End: 2022-09-26
Payer: MEDICAID

## 2022-09-26 ENCOUNTER — LAB VISIT (OUTPATIENT)
Dept: LAB | Facility: HOSPITAL | Age: 32
End: 2022-09-26
Attending: NURSE PRACTITIONER
Payer: MEDICAID

## 2022-09-26 VITALS
DIASTOLIC BLOOD PRESSURE: 78 MMHG | HEIGHT: 63 IN | SYSTOLIC BLOOD PRESSURE: 110 MMHG | BODY MASS INDEX: 38.98 KG/M2 | WEIGHT: 220 LBS

## 2022-09-26 DIAGNOSIS — N92.6 MISSED MENSES: Primary | ICD-10-CM

## 2022-09-26 DIAGNOSIS — N92.6 MISSED MENSES: ICD-10-CM

## 2022-09-26 DIAGNOSIS — Z11.3 SCREEN FOR STD (SEXUALLY TRANSMITTED DISEASE): ICD-10-CM

## 2022-09-26 DIAGNOSIS — Z32.00 POSSIBLE PREGNANCY: ICD-10-CM

## 2022-09-26 DIAGNOSIS — Z12.4 PAPANICOLAOU SMEAR FOR CERVICAL CANCER SCREENING: ICD-10-CM

## 2022-09-26 LAB
B-HCG UR QL: NEGATIVE
CTP QC/QA: YES
HAV IGM SERPL QL IA: NORMAL
HBV CORE IGM SERPL QL IA: NORMAL
HBV SURFACE AG SERPL QL IA: NORMAL
HCV AB SERPL QL IA: NORMAL
HIV 1+2 AB+HIV1 P24 AG SERPL QL IA: NORMAL
PROLACTIN SERPL IA-MCNC: 19.2 NG/ML (ref 5.2–26.5)

## 2022-09-26 PROCEDURE — 99213 OFFICE O/P EST LOW 20 MIN: CPT | Mod: PBBFAC | Performed by: NURSE PRACTITIONER

## 2022-09-26 PROCEDURE — 3008F PR BODY MASS INDEX (BMI) DOCUMENTED: ICD-10-PCS | Mod: CPTII,,, | Performed by: NURSE PRACTITIONER

## 2022-09-26 PROCEDURE — 3078F PR MOST RECENT DIASTOLIC BLOOD PRESSURE < 80 MM HG: ICD-10-PCS | Mod: CPTII,,, | Performed by: NURSE PRACTITIONER

## 2022-09-26 PROCEDURE — 87389 HIV-1 AG W/HIV-1&-2 AB AG IA: CPT | Performed by: NURSE PRACTITIONER

## 2022-09-26 PROCEDURE — 88175 CYTOPATH C/V AUTO FLUID REDO: CPT | Performed by: NURSE PRACTITIONER

## 2022-09-26 PROCEDURE — 99999 PR PBB SHADOW E&M-EST. PATIENT-LVL III: CPT | Mod: PBBFAC,,, | Performed by: NURSE PRACTITIONER

## 2022-09-26 PROCEDURE — 86592 SYPHILIS TEST NON-TREP QUAL: CPT | Performed by: NURSE PRACTITIONER

## 2022-09-26 PROCEDURE — 87491 CHLMYD TRACH DNA AMP PROBE: CPT | Performed by: NURSE PRACTITIONER

## 2022-09-26 PROCEDURE — 99203 PR OFFICE/OUTPT VISIT, NEW, LEVL III, 30-44 MIN: ICD-10-PCS | Mod: S$PBB,,, | Performed by: NURSE PRACTITIONER

## 2022-09-26 PROCEDURE — 87591 N.GONORRHOEAE DNA AMP PROB: CPT | Performed by: NURSE PRACTITIONER

## 2022-09-26 PROCEDURE — 1160F PR REVIEW ALL MEDS BY PRESCRIBER/CLIN PHARMACIST DOCUMENTED: ICD-10-PCS | Mod: CPTII,,, | Performed by: NURSE PRACTITIONER

## 2022-09-26 PROCEDURE — 1159F MED LIST DOCD IN RCRD: CPT | Mod: CPTII,,, | Performed by: NURSE PRACTITIONER

## 2022-09-26 PROCEDURE — 1160F RVW MEDS BY RX/DR IN RCRD: CPT | Mod: CPTII,,, | Performed by: NURSE PRACTITIONER

## 2022-09-26 PROCEDURE — 1159F PR MEDICATION LIST DOCUMENTED IN MEDICAL RECORD: ICD-10-PCS | Mod: CPTII,,, | Performed by: NURSE PRACTITIONER

## 2022-09-26 PROCEDURE — 81025 URINE PREGNANCY TEST: CPT | Mod: PBBFAC | Performed by: NURSE PRACTITIONER

## 2022-09-26 PROCEDURE — 3008F BODY MASS INDEX DOCD: CPT | Mod: CPTII,,, | Performed by: NURSE PRACTITIONER

## 2022-09-26 PROCEDURE — 99203 OFFICE O/P NEW LOW 30 MIN: CPT | Mod: S$PBB,,, | Performed by: NURSE PRACTITIONER

## 2022-09-26 PROCEDURE — 3074F SYST BP LT 130 MM HG: CPT | Mod: CPTII,,, | Performed by: NURSE PRACTITIONER

## 2022-09-26 PROCEDURE — 36415 COLL VENOUS BLD VENIPUNCTURE: CPT | Performed by: NURSE PRACTITIONER

## 2022-09-26 PROCEDURE — 3044F HG A1C LEVEL LT 7.0%: CPT | Mod: CPTII,,, | Performed by: NURSE PRACTITIONER

## 2022-09-26 PROCEDURE — 84146 ASSAY OF PROLACTIN: CPT | Performed by: NURSE PRACTITIONER

## 2022-09-26 PROCEDURE — 3078F DIAST BP <80 MM HG: CPT | Mod: CPTII,,, | Performed by: NURSE PRACTITIONER

## 2022-09-26 PROCEDURE — 87624 HPV HI-RISK TYP POOLED RSLT: CPT | Performed by: NURSE PRACTITIONER

## 2022-09-26 PROCEDURE — 99999 PR PBB SHADOW E&M-EST. PATIENT-LVL III: ICD-10-PCS | Mod: PBBFAC,,, | Performed by: NURSE PRACTITIONER

## 2022-09-26 PROCEDURE — 3074F PR MOST RECENT SYSTOLIC BLOOD PRESSURE < 130 MM HG: ICD-10-PCS | Mod: CPTII,,, | Performed by: NURSE PRACTITIONER

## 2022-09-26 PROCEDURE — 80074 ACUTE HEPATITIS PANEL: CPT | Performed by: NURSE PRACTITIONER

## 2022-09-26 PROCEDURE — 3044F PR MOST RECENT HEMOGLOBIN A1C LEVEL <7.0%: ICD-10-PCS | Mod: CPTII,,, | Performed by: NURSE PRACTITIONER

## 2022-09-26 RX ORDER — VENLAFAXINE HYDROCHLORIDE 75 MG/1
CAPSULE, EXTENDED RELEASE ORAL
COMMUNITY
Start: 2022-06-02 | End: 2022-10-19 | Stop reason: ALTCHOICE

## 2022-09-26 NOTE — PROGRESS NOTES
Subjective:       Patient ID: Radames Cota is a 32 y.o. female.    Chief Complaint:  Well Woman    Patient's last menstrual period was 2022 (exact date).  History of Present Illness    Cycles have bee regular every since    They came on onece a montha nad lasted 4 dayas no heavy bleeding no craming   They started beeing irregular in 3/2022. No cycle in 2022, April, may,  and July cycles were reguar, althoug it was al ittle late for . She has not has a cycle since 2022.      OB History    Para Term  AB Living   4 4 2 2   4   SAB IAB Ectopic Multiple Live Births           4      # Outcome Date GA Lbr Carter/2nd Weight Sex Delivery Anes PTL Lv   4  10/05/18 36w0d   F Vag-Spont EPI  NAJMA   3 Term 03/04/15 37w0d   F Vag-Spont EPI  NAJMA   2  12 28w0d   M Vag-Spont EPI Y NAJMA   1 Term 11    M Vag-Spont EPI  NAJMA       Review of Systems  Review of Systems   Genitourinary:  Positive for menstrual problem.         Objective:    Physical Exam  Genitourinary:     General: Normal vulva.      Exam position: Supine.      Vagina: Normal.      Cervix: Normal.      Uterus: Normal.       Adnexa: Right adnexa normal and left adnexa normal.         Assessment:     1. Missed menses    2. Possible pregnancy    3. Papanicolaou smear for cervical cancer screening    4. Screen for STD (sexually transmitted disease)              Plan:   Radames was seen today for well woman.    Diagnoses and all orders for this visit:    Missed menses  -     PROLACTIN; Future  -     C. trachomatis/N. gonorrhoeae by AMP DNA [facility]; Vagina    Possible pregnancy  -     POCT Urine Pregnancy    Papanicolaou smear for cervical cancer screening  -     Liquid-Based Pap Smear, Screening  -     HPV High Risk Genotypes, PCR    Screen for STD (sexually transmitted disease)  -     HIV 1/2 Ag/Ab (4th Gen); Future  -     RPR; Future  -     Hepatitis Panel, Acute; Future      Verbalizes understanding that if she is  NOT on hormonal birth control she must have a cycle at least every 3 to 6 months in order to decrease risk of uterine cancer, uterine  fibroids, or uterine tumors. If she not on hormonal birth control and she does not have a cycle at least every 3-6 months she must be prescribed provera for 10 days to induce a cycle. Verbalizes understanding that prior to taking this medication she MUST do a UPT prior to taking provera to ensure she is not pregnancy prior to ingesting provera. If she does not have a cycle 14 days after taking her last provera pill, she must rtc for further evaluation of amenorrhea.    Verbalizes understanding that if she is ON a hormonal birth control. It does not matter whether she has a cycle or not.

## 2022-09-27 LAB
C TRACH DNA SPEC QL NAA+PROBE: NOT DETECTED
N GONORRHOEA DNA SPEC QL NAA+PROBE: NOT DETECTED
RPR SER QL: NORMAL

## 2022-09-29 LAB
HPV HR 12 DNA SPEC QL NAA+PROBE: NEGATIVE
HPV16 AG SPEC QL: NEGATIVE
HPV18 DNA SPEC QL NAA+PROBE: NEGATIVE

## 2022-09-30 LAB
FINAL PATHOLOGIC DIAGNOSIS: NORMAL
Lab: NORMAL

## 2022-10-12 ENCOUNTER — PATIENT MESSAGE (OUTPATIENT)
Dept: FAMILY MEDICINE | Facility: CLINIC | Age: 32
End: 2022-10-12
Payer: MEDICAID

## 2022-10-13 ENCOUNTER — TELEPHONE (OUTPATIENT)
Dept: FAMILY MEDICINE | Facility: CLINIC | Age: 32
End: 2022-10-13
Payer: MEDICAID

## 2022-10-13 NOTE — TELEPHONE ENCOUNTER
"Pt portal message    " Hamlet Hargrove.     Im sending this message because Mary noticed that the current dosage of Effexor that I am on isnt working as well as it was. Mary been feeling a bit anxious and overwhelmed the past few weeks, I havent been able to sleep and my headaches are becoming an every day issue again.     I also wanted to see if there was a medication that is similar to Effexor but curbs my appetite.  Mary gained about 35lbs since Ive been consistently taking my current dosage. Ive been dieting (cut sodas, caffeine, fried foods etc.) and theres still weight gain even with exercise. Im open to trying different options.     Thanks In Advance."    Please advise    "

## 2022-10-18 ENCOUNTER — PATIENT MESSAGE (OUTPATIENT)
Dept: FAMILY MEDICINE | Facility: CLINIC | Age: 32
End: 2022-10-18
Payer: MEDICAID

## 2022-10-18 ENCOUNTER — TELEPHONE (OUTPATIENT)
Dept: FAMILY MEDICINE | Facility: CLINIC | Age: 32
End: 2022-10-18
Payer: MEDICAID

## 2022-10-18 DIAGNOSIS — F32.A ANXIETY AND DEPRESSION: Primary | ICD-10-CM

## 2022-10-18 DIAGNOSIS — F41.9 ANXIETY AND DEPRESSION: Primary | ICD-10-CM

## 2022-10-18 NOTE — TELEPHONE ENCOUNTER
"Pt portal message    Previously sent.     " Hamlet Hargrove.     Im sending this message because Mary noticed that the current dosage of Effexor that I am on isnt working as well as it was. Mary been feeling a bit anxious and overwhelmed the past few weeks, I havent been able to sleep and my headaches are becoming an every day issue again.     I also wanted to see if there was a medication that is similar to Effexor but curbs my appetite.  Mary gained about 35lbs since Ive been consistently taking my current dosage. Ive been dieting (cut sodas, caffeine, fried foods etc.) and theres still weight gain even with exercise. Im open to trying different options.     Thanks In Advance."    Please advise    "

## 2022-10-19 RX ORDER — FLUOXETINE HYDROCHLORIDE 20 MG/1
20 CAPSULE ORAL DAILY
Qty: 90 CAPSULE | Refills: 0 | Status: SHIPPED | OUTPATIENT
Start: 2022-10-19 | End: 2023-01-16 | Stop reason: SDUPTHER

## 2022-10-19 NOTE — TELEPHONE ENCOUNTER
Ok. Stop Effexor-XR. Try Prozac.    I have put the following orders and/or medications to this note.  Please advise pt and assist.    No orders of the defined types were placed in this encounter.      Medications Ordered This Encounter   Medications    FLUoxetine 20 MG capsule     Sig: Take 1 capsule (20 mg total) by mouth once daily.     Dispense:  90 capsule     Refill:  0

## 2022-10-19 NOTE — TELEPHONE ENCOUNTER
Call patient.    Advise I recommend she try Prozac 20 mg daily AND can switch without weaning from Effexor-XR; however, she may experience slight anxiety for about a week as she switches. Let me know if okay with her.    I see she has virtual appt w/social work/psych scheduled for 11/4/22; keep that appt.

## 2022-11-08 ENCOUNTER — PATIENT MESSAGE (OUTPATIENT)
Dept: OBSTETRICS AND GYNECOLOGY | Facility: CLINIC | Age: 32
End: 2022-11-08
Payer: MEDICAID

## 2022-11-29 ENCOUNTER — PATIENT MESSAGE (OUTPATIENT)
Dept: FAMILY MEDICINE | Facility: CLINIC | Age: 32
End: 2022-11-29
Payer: MEDICAID

## 2022-11-29 DIAGNOSIS — F32.A ANXIETY AND DEPRESSION: ICD-10-CM

## 2022-11-29 DIAGNOSIS — F41.9 ANXIETY AND DEPRESSION: ICD-10-CM

## 2022-11-29 RX ORDER — VENLAFAXINE HYDROCHLORIDE 150 MG/1
CAPSULE, EXTENDED RELEASE ORAL
Qty: 90 CAPSULE | Refills: 1 | OUTPATIENT
Start: 2022-11-29

## 2022-11-29 NOTE — TELEPHONE ENCOUNTER
No new care gaps identified.  [facility] Embedded Care Gaps. Reference number: [id number]. 11/29/2022   3:35:56 AM CST

## 2022-11-29 NOTE — TELEPHONE ENCOUNTER
Refill Decision Note   Radames Cipriano  is requesting a refill authorization.  Brief Assessment and Rationale for Refill:  Quick Discontinue    -Medication-Related Problems Identified: Dose adjustment  Medication Therapy Plan:  discontinued on 10/19/2022 by Elysia Hargrove MD for the following reason: Alternate therapy.    Medication Reconciliation Completed: No   Comments:     No Care Gaps recommended.     Note composed:6:00 AM 11/29/2022

## 2022-12-30 ENCOUNTER — OFFICE VISIT (OUTPATIENT)
Dept: OBSTETRICS AND GYNECOLOGY | Facility: CLINIC | Age: 32
End: 2022-12-30
Payer: MEDICAID

## 2022-12-30 ENCOUNTER — LAB VISIT (OUTPATIENT)
Dept: LAB | Facility: HOSPITAL | Age: 32
End: 2022-12-30
Attending: NURSE PRACTITIONER
Payer: MEDICAID

## 2022-12-30 VITALS
SYSTOLIC BLOOD PRESSURE: 122 MMHG | DIASTOLIC BLOOD PRESSURE: 76 MMHG | WEIGHT: 224.88 LBS | BODY MASS INDEX: 39.84 KG/M2 | HEIGHT: 63 IN

## 2022-12-30 DIAGNOSIS — N92.6 MISSED MENSES: Primary | ICD-10-CM

## 2022-12-30 DIAGNOSIS — R10.2 PELVIC PAIN: ICD-10-CM

## 2022-12-30 DIAGNOSIS — N92.6 MISSED MENSES: ICD-10-CM

## 2022-12-30 PROCEDURE — 3078F DIAST BP <80 MM HG: CPT | Mod: CPTII,,, | Performed by: NURSE PRACTITIONER

## 2022-12-30 PROCEDURE — 36415 COLL VENOUS BLD VENIPUNCTURE: CPT | Performed by: NURSE PRACTITIONER

## 2022-12-30 PROCEDURE — 3044F PR MOST RECENT HEMOGLOBIN A1C LEVEL <7.0%: ICD-10-PCS | Mod: CPTII,,, | Performed by: NURSE PRACTITIONER

## 2022-12-30 PROCEDURE — 1160F RVW MEDS BY RX/DR IN RCRD: CPT | Mod: CPTII,,, | Performed by: NURSE PRACTITIONER

## 2022-12-30 PROCEDURE — 99213 OFFICE O/P EST LOW 20 MIN: CPT | Mod: S$PBB,,, | Performed by: NURSE PRACTITIONER

## 2022-12-30 PROCEDURE — 3044F HG A1C LEVEL LT 7.0%: CPT | Mod: CPTII,,, | Performed by: NURSE PRACTITIONER

## 2022-12-30 PROCEDURE — 1160F PR REVIEW ALL MEDS BY PRESCRIBER/CLIN PHARMACIST DOCUMENTED: ICD-10-PCS | Mod: CPTII,,, | Performed by: NURSE PRACTITIONER

## 2022-12-30 PROCEDURE — 3074F PR MOST RECENT SYSTOLIC BLOOD PRESSURE < 130 MM HG: ICD-10-PCS | Mod: CPTII,,, | Performed by: NURSE PRACTITIONER

## 2022-12-30 PROCEDURE — 99999 PR PBB SHADOW E&M-EST. PATIENT-LVL III: ICD-10-PCS | Mod: PBBFAC,,, | Performed by: NURSE PRACTITIONER

## 2022-12-30 PROCEDURE — 99213 OFFICE O/P EST LOW 20 MIN: CPT | Mod: PBBFAC,25 | Performed by: NURSE PRACTITIONER

## 2022-12-30 PROCEDURE — 3008F PR BODY MASS INDEX (BMI) DOCUMENTED: ICD-10-PCS | Mod: CPTII,,, | Performed by: NURSE PRACTITIONER

## 2022-12-30 PROCEDURE — 84443 ASSAY THYROID STIM HORMONE: CPT | Performed by: NURSE PRACTITIONER

## 2022-12-30 PROCEDURE — 3008F BODY MASS INDEX DOCD: CPT | Mod: CPTII,,, | Performed by: NURSE PRACTITIONER

## 2022-12-30 PROCEDURE — 99213 PR OFFICE/OUTPT VISIT, EST, LEVL III, 20-29 MIN: ICD-10-PCS | Mod: S$PBB,,, | Performed by: NURSE PRACTITIONER

## 2022-12-30 PROCEDURE — 3078F PR MOST RECENT DIASTOLIC BLOOD PRESSURE < 80 MM HG: ICD-10-PCS | Mod: CPTII,,, | Performed by: NURSE PRACTITIONER

## 2022-12-30 PROCEDURE — 84146 ASSAY OF PROLACTIN: CPT | Performed by: NURSE PRACTITIONER

## 2022-12-30 PROCEDURE — 1159F MED LIST DOCD IN RCRD: CPT | Mod: CPTII,,, | Performed by: NURSE PRACTITIONER

## 2022-12-30 PROCEDURE — 3074F SYST BP LT 130 MM HG: CPT | Mod: CPTII,,, | Performed by: NURSE PRACTITIONER

## 2022-12-30 PROCEDURE — 1159F PR MEDICATION LIST DOCUMENTED IN MEDICAL RECORD: ICD-10-PCS | Mod: CPTII,,, | Performed by: NURSE PRACTITIONER

## 2022-12-30 PROCEDURE — 99999 PR PBB SHADOW E&M-EST. PATIENT-LVL III: CPT | Mod: PBBFAC,,, | Performed by: NURSE PRACTITIONER

## 2022-12-30 RX ORDER — MEDROXYPROGESTERONE ACETATE 10 MG/1
10 TABLET ORAL DAILY
Qty: 10 TABLET | Refills: 0 | Status: SHIPPED | OUTPATIENT
Start: 2022-12-30 | End: 2023-01-17

## 2022-12-30 NOTE — PROGRESS NOTES
Subjective:       Patient ID: Radames Cota is a 32 y.o. female.    Chief Complaint:  Provera Consult     Patient's last menstrual period was 2022.  History of Present Illness  Complains of an constant ache to right side of pelvic times one month (non cyclic)   Has not had a cycle since 2023, desires provera and if she experiences withdrawal bleeding desires birth control pills to regulate her cycle.     OB History    Para Term  AB Living   4 4 2 2   4   SAB IAB Ectopic Multiple Live Births           4      # Outcome Date GA Lbr Carter/2nd Weight Sex Delivery Anes PTL Lv   4  10/05/18 36w0d   F Vag-Spont EPI  NAJMA   3 Term 03/04/15 37w0d   F Vag-Spont EPI  NAJMA   2  12 28w0d   M Vag-Spont EPI Y NAJMA   1 Term 11    M Vag-Spont EPI  NAJMA       Review of Systems  Review of Systems        Objective:    Physical Exam  Genitourinary:     General: Normal vulva.      Exam position: Lithotomy position.      Vagina: No signs of injury and foreign body. No vaginal discharge, erythema, tenderness, bleeding, lesions or prolapsed vaginal walls.      Cervix: No cervical motion tenderness, discharge, friability, lesion, erythema, cervical bleeding or eversion.      Uterus: Not deviated, not enlarged, not fixed, not tender and no uterine prolapse.       Adnexa:         Right: No mass, tenderness or fullness.          Left: No mass, tenderness or fullness.           Assessment:     1. Missed menses    2. Pelvic pain              Plan:   Radamse was seen today for provera consult .    Diagnoses and all orders for this visit:    Missed menses  -     medroxyPROGESTERone (PROVERA) 10 MG tablet; Take 1 tablet (10 mg total) by mouth once daily.  -     Prolactin; Future  -     TSH; Future    Pelvic pain  -     US Pelvis Complete Non OB; Future      Verbalizes understanding that if she is NOT on hormonal birth control she must have a cycle at least every 3 to 6 months in order to decrease risk of  uterine cancer, uterine  fibroids, or uterine tumors. If she not on hormonal birth control and she does not have a cycle at least every 3-6 months she must be prescribed provera for 10 days to induce a cycle. Verbalizes understanding that prior to taking this medication she MUST do a UPT prior to taking provera to ensure she is not pregnancy prior to ingesting provera. If she does not have a cycle 14 days after taking her last provera pill, she must rtc for further evaluation of amenorrhea.    Verbalizes understanding that if she is ON a hormonal birth control. It does not matter whether she has a cycle or not.      Desires to begin loestrin 21 if she experiences withdrawal bleeding

## 2022-12-31 LAB
PROLACTIN SERPL IA-MCNC: 20 NG/ML (ref 5.2–26.5)
TSH SERPL DL<=0.005 MIU/L-ACNC: 0.78 UIU/ML (ref 0.4–4)

## 2023-01-13 ENCOUNTER — PATIENT MESSAGE (OUTPATIENT)
Dept: OBSTETRICS AND GYNECOLOGY | Facility: CLINIC | Age: 33
End: 2023-01-13
Payer: MEDICAID

## 2023-01-17 ENCOUNTER — TELEPHONE (OUTPATIENT)
Dept: FAMILY MEDICINE | Facility: CLINIC | Age: 33
End: 2023-01-17
Payer: MEDICAID

## 2023-01-17 DIAGNOSIS — N91.1 SECONDARY AMENORRHEA: ICD-10-CM

## 2023-01-17 DIAGNOSIS — N92.6 MISSED MENSES: Primary | ICD-10-CM

## 2023-01-17 DIAGNOSIS — F41.9 ANXIETY AND DEPRESSION: ICD-10-CM

## 2023-01-17 DIAGNOSIS — F32.A ANXIETY AND DEPRESSION: ICD-10-CM

## 2023-01-17 RX ORDER — NORETHINDRONE ACETATE AND ETHINYL ESTRADIOL .02; 1 MG/1; MG/1
1 TABLET ORAL DAILY
Qty: 21 TABLET | Refills: 15 | Status: SHIPPED | OUTPATIENT
Start: 2023-01-17 | End: 2023-10-24 | Stop reason: ALTCHOICE

## 2023-01-17 NOTE — TELEPHONE ENCOUNTER
No new care gaps identified.  [facility] Embedded Care Gaps. Reference number: [id number]. 1/17/2023   4:09:48 PM CST

## 2023-01-18 ENCOUNTER — PATIENT MESSAGE (OUTPATIENT)
Dept: FAMILY MEDICINE | Facility: CLINIC | Age: 33
End: 2023-01-18
Payer: MEDICAID

## 2023-01-18 RX ORDER — FLUOXETINE HYDROCHLORIDE 20 MG/1
CAPSULE ORAL
Qty: 90 CAPSULE | Refills: 0 | OUTPATIENT
Start: 2023-01-18

## 2023-01-18 NOTE — TELEPHONE ENCOUNTER
"Pt portal message    " Hello! I tried to request a refill for Prozac through my pharmacy but they informed me that it was not approved. Is there anyway to get my prescription sent over to them ([address] in [place])     Also, I wanted to see if the dosage can be increased. Thanks in Advance!"    Please advise    "

## 2023-01-18 NOTE — TELEPHONE ENCOUNTER
Refill Decision Note   Radames Cipriano  is requesting a refill authorization.  Brief Assessment and Rationale for Refill:  Quick Discontinue     Medication Therapy Plan:  Duplicate    Medication Reconciliation Completed: No   Comments:     No Care Gaps recommended.     Note composed:11:59 AM 01/18/2023

## 2023-02-01 ENCOUNTER — PATIENT MESSAGE (OUTPATIENT)
Dept: OBSTETRICS AND GYNECOLOGY | Facility: CLINIC | Age: 33
End: 2023-02-01
Payer: MEDICAID

## 2023-02-01 DIAGNOSIS — R11.0 NAUSEA: Primary | ICD-10-CM

## 2023-02-02 RX ORDER — ONDANSETRON 8 MG/1
8 TABLET, ORALLY DISINTEGRATING ORAL EVERY 6 HOURS PRN
Qty: 30 TABLET | Refills: 0 | Status: SHIPPED | OUTPATIENT
Start: 2023-02-02 | End: 2023-03-04

## 2023-02-06 ENCOUNTER — PATIENT MESSAGE (OUTPATIENT)
Dept: OBSTETRICS AND GYNECOLOGY | Facility: CLINIC | Age: 33
End: 2023-02-06
Payer: MEDICAID

## 2023-06-12 ENCOUNTER — PATIENT MESSAGE (OUTPATIENT)
Dept: OBSTETRICS AND GYNECOLOGY | Facility: CLINIC | Age: 33
End: 2023-06-12
Payer: MEDICAID

## 2023-06-20 ENCOUNTER — PATIENT MESSAGE (OUTPATIENT)
Dept: RESEARCH | Facility: HOSPITAL | Age: 33
End: 2023-06-20
Payer: MEDICAID

## 2023-07-05 ENCOUNTER — PATIENT MESSAGE (OUTPATIENT)
Dept: RESEARCH | Facility: HOSPITAL | Age: 33
End: 2023-07-05
Payer: MEDICAID

## 2023-07-06 ENCOUNTER — LAB VISIT (OUTPATIENT)
Dept: LAB | Facility: HOSPITAL | Age: 33
End: 2023-07-06
Attending: NURSE PRACTITIONER
Payer: MEDICAID

## 2023-07-06 ENCOUNTER — OFFICE VISIT (OUTPATIENT)
Dept: FAMILY MEDICINE | Facility: CLINIC | Age: 33
End: 2023-07-06
Payer: MEDICAID

## 2023-07-06 VITALS
BODY MASS INDEX: 39.84 KG/M2 | DIASTOLIC BLOOD PRESSURE: 84 MMHG | OXYGEN SATURATION: 96 % | WEIGHT: 224.88 LBS | RESPIRATION RATE: 16 BRPM | HEART RATE: 90 BPM | HEIGHT: 63 IN | SYSTOLIC BLOOD PRESSURE: 118 MMHG

## 2023-07-06 DIAGNOSIS — F41.9 ANXIETY AND DEPRESSION: Primary | ICD-10-CM

## 2023-07-06 DIAGNOSIS — E66.9 OBESITY (BMI 30.0-34.9): ICD-10-CM

## 2023-07-06 DIAGNOSIS — Z00.00 HEALTHCARE MAINTENANCE: ICD-10-CM

## 2023-07-06 DIAGNOSIS — E66.01 SEVERE OBESITY (BMI 35.0-39.9) WITH COMORBIDITY: ICD-10-CM

## 2023-07-06 DIAGNOSIS — F32.A ANXIETY AND DEPRESSION: Primary | ICD-10-CM

## 2023-07-06 PROBLEM — E66.811 OBESITY (BMI 30.0-34.9): Status: RESOLVED | Noted: 2021-05-31 | Resolved: 2023-07-06

## 2023-07-06 LAB
ALBUMIN SERPL BCP-MCNC: 4 G/DL (ref 3.5–5.2)
ALP SERPL-CCNC: 49 U/L (ref 55–135)
ALT SERPL W/O P-5'-P-CCNC: 15 U/L (ref 10–44)
ANION GAP SERPL CALC-SCNC: 10 MMOL/L (ref 8–16)
AST SERPL-CCNC: 11 U/L (ref 10–40)
BASOPHILS # BLD AUTO: 0.03 K/UL (ref 0–0.2)
BASOPHILS NFR BLD: 0.4 % (ref 0–1.9)
BILIRUB SERPL-MCNC: 0.4 MG/DL (ref 0.1–1)
BUN SERPL-MCNC: 6 MG/DL (ref 6–20)
CALCIUM SERPL-MCNC: 9.2 MG/DL (ref 8.7–10.5)
CHLORIDE SERPL-SCNC: 104 MMOL/L (ref 95–110)
CHOLEST SERPL-MCNC: 213 MG/DL (ref 120–199)
CHOLEST/HDLC SERPL: 4.4 {RATIO} (ref 2–5)
CO2 SERPL-SCNC: 24 MMOL/L (ref 23–29)
CREAT SERPL-MCNC: 0.7 MG/DL (ref 0.5–1.4)
DIFFERENTIAL METHOD: ABNORMAL
EOSINOPHIL # BLD AUTO: 0.1 K/UL (ref 0–0.5)
EOSINOPHIL NFR BLD: 1.1 % (ref 0–8)
ERYTHROCYTE [DISTWIDTH] IN BLOOD BY AUTOMATED COUNT: 15.2 % (ref 11.5–14.5)
EST. GFR  (NO RACE VARIABLE): >60 ML/MIN/1.73 M^2
ESTIMATED AVG GLUCOSE: 103 MG/DL (ref 68–131)
GLUCOSE SERPL-MCNC: 66 MG/DL (ref 70–110)
HBA1C MFR BLD: 5.2 % (ref 4–5.6)
HCT VFR BLD AUTO: 43.6 % (ref 37–48.5)
HDLC SERPL-MCNC: 48 MG/DL (ref 40–75)
HDLC SERPL: 22.5 % (ref 20–50)
HGB BLD-MCNC: 13.4 G/DL (ref 12–16)
IMM GRANULOCYTES # BLD AUTO: 0.02 K/UL (ref 0–0.04)
IMM GRANULOCYTES NFR BLD AUTO: 0.3 % (ref 0–0.5)
LDLC SERPL CALC-MCNC: 152 MG/DL (ref 63–159)
LYMPHOCYTES # BLD AUTO: 2.2 K/UL (ref 1–4.8)
LYMPHOCYTES NFR BLD: 30.2 % (ref 18–48)
MCH RBC QN AUTO: 28.1 PG (ref 27–31)
MCHC RBC AUTO-ENTMCNC: 30.7 G/DL (ref 32–36)
MCV RBC AUTO: 91 FL (ref 82–98)
MONOCYTES # BLD AUTO: 0.8 K/UL (ref 0.3–1)
MONOCYTES NFR BLD: 10.5 % (ref 4–15)
NEUTROPHILS # BLD AUTO: 4.2 K/UL (ref 1.8–7.7)
NEUTROPHILS NFR BLD: 57.5 % (ref 38–73)
NONHDLC SERPL-MCNC: 165 MG/DL
NRBC BLD-RTO: 0 /100 WBC
PLATELET # BLD AUTO: 221 K/UL (ref 150–450)
PMV BLD AUTO: 12.3 FL (ref 9.2–12.9)
POTASSIUM SERPL-SCNC: 4.2 MMOL/L (ref 3.5–5.1)
PROT SERPL-MCNC: 7.3 G/DL (ref 6–8.4)
RBC # BLD AUTO: 4.77 M/UL (ref 4–5.4)
SODIUM SERPL-SCNC: 138 MMOL/L (ref 136–145)
T4 FREE SERPL-MCNC: 0.84 NG/DL (ref 0.71–1.51)
TRIGL SERPL-MCNC: 65 MG/DL (ref 30–150)
TSH SERPL DL<=0.005 MIU/L-ACNC: 0.85 UIU/ML (ref 0.4–4)
WBC # BLD AUTO: 7.31 K/UL (ref 3.9–12.7)

## 2023-07-06 PROCEDURE — 84443 ASSAY THYROID STIM HORMONE: CPT | Performed by: NURSE PRACTITIONER

## 2023-07-06 PROCEDURE — 99999 PR PBB SHADOW E&M-EST. PATIENT-LVL III: ICD-10-PCS | Mod: PBBFAC,,, | Performed by: NURSE PRACTITIONER

## 2023-07-06 PROCEDURE — 84439 ASSAY OF FREE THYROXINE: CPT | Performed by: NURSE PRACTITIONER

## 2023-07-06 PROCEDURE — 3079F DIAST BP 80-89 MM HG: CPT | Mod: CPTII,,, | Performed by: NURSE PRACTITIONER

## 2023-07-06 PROCEDURE — 99999 PR PBB SHADOW E&M-EST. PATIENT-LVL III: CPT | Mod: PBBFAC,,, | Performed by: NURSE PRACTITIONER

## 2023-07-06 PROCEDURE — 80061 LIPID PANEL: CPT | Performed by: NURSE PRACTITIONER

## 2023-07-06 PROCEDURE — 36415 COLL VENOUS BLD VENIPUNCTURE: CPT | Mod: PO | Performed by: NURSE PRACTITIONER

## 2023-07-06 PROCEDURE — 85025 COMPLETE CBC W/AUTO DIFF WBC: CPT | Performed by: NURSE PRACTITIONER

## 2023-07-06 PROCEDURE — 1160F RVW MEDS BY RX/DR IN RCRD: CPT | Mod: CPTII,,, | Performed by: NURSE PRACTITIONER

## 2023-07-06 PROCEDURE — 80053 COMPREHEN METABOLIC PANEL: CPT | Performed by: NURSE PRACTITIONER

## 2023-07-06 PROCEDURE — 3074F PR MOST RECENT SYSTOLIC BLOOD PRESSURE < 130 MM HG: ICD-10-PCS | Mod: CPTII,,, | Performed by: NURSE PRACTITIONER

## 2023-07-06 PROCEDURE — 1160F PR REVIEW ALL MEDS BY PRESCRIBER/CLIN PHARMACIST DOCUMENTED: ICD-10-PCS | Mod: CPTII,,, | Performed by: NURSE PRACTITIONER

## 2023-07-06 PROCEDURE — 3074F SYST BP LT 130 MM HG: CPT | Mod: CPTII,,, | Performed by: NURSE PRACTITIONER

## 2023-07-06 PROCEDURE — 83036 HEMOGLOBIN GLYCOSYLATED A1C: CPT | Performed by: NURSE PRACTITIONER

## 2023-07-06 PROCEDURE — 1159F PR MEDICATION LIST DOCUMENTED IN MEDICAL RECORD: ICD-10-PCS | Mod: CPTII,,, | Performed by: NURSE PRACTITIONER

## 2023-07-06 PROCEDURE — 99395 PR PREVENTIVE VISIT,EST,18-39: ICD-10-PCS | Mod: S$PBB,,, | Performed by: NURSE PRACTITIONER

## 2023-07-06 PROCEDURE — 3079F PR MOST RECENT DIASTOLIC BLOOD PRESSURE 80-89 MM HG: ICD-10-PCS | Mod: CPTII,,, | Performed by: NURSE PRACTITIONER

## 2023-07-06 PROCEDURE — 99395 PREV VISIT EST AGE 18-39: CPT | Mod: S$PBB,,, | Performed by: NURSE PRACTITIONER

## 2023-07-06 PROCEDURE — 3008F BODY MASS INDEX DOCD: CPT | Mod: CPTII,,, | Performed by: NURSE PRACTITIONER

## 2023-07-06 PROCEDURE — 3008F PR BODY MASS INDEX (BMI) DOCUMENTED: ICD-10-PCS | Mod: CPTII,,, | Performed by: NURSE PRACTITIONER

## 2023-07-06 PROCEDURE — 99213 OFFICE O/P EST LOW 20 MIN: CPT | Mod: PBBFAC,PO | Performed by: NURSE PRACTITIONER

## 2023-07-06 PROCEDURE — 1159F MED LIST DOCD IN RCRD: CPT | Mod: CPTII,,, | Performed by: NURSE PRACTITIONER

## 2023-07-06 RX ORDER — FLUOXETINE HYDROCHLORIDE 40 MG/1
40 CAPSULE ORAL DAILY
Qty: 30 CAPSULE | Refills: 2 | Status: SHIPPED | OUTPATIENT
Start: 2023-07-06 | End: 2023-11-01 | Stop reason: SDUPTHER

## 2023-07-06 NOTE — PROGRESS NOTES
Radames Cota  07/06/2023  [id number]    Elysia Hargrove MD  Patient Care Team:  Elysia Hargrove MD as PCP - General (Family Medicine)          Visit Type: Health maintenance   Chief Complaint:  Chief Complaint   Patient presents with    Annual Exam       History of Present Illness:     31 yo female presents for annual exam and blood work. Pt reports her symptoms have returned and would like to have her prozac adjusted. States she has had insomnia, anxiety and depression for the past two months.    No other complaints at this time       History:  Past Medical History:   Diagnosis Date    Anxiety     Depression     Preeclampsia     during 3rd and 4th pregnancies     Past Surgical History:   Procedure Laterality Date    CHOLECYSTECTOMY  01/29/2019     Family History   Problem Relation Age of Onset    Thyroid disease Mother     Hypertension Mother     Bipolar disorder Father     No Known Problems Sister     No Known Problems Brother     No Known Problems Brother     Diabetes Maternal Grandmother     No Known Problems Daughter     No Known Problems Daughter     No Known Problems Son     SIDS Son     Diabetes Maternal Aunt      Social History     Socioeconomic History    Marital status: Single   Occupational History    Occupation:    Tobacco Use    Smoking status: Never    Smokeless tobacco: Never   Substance and Sexual Activity    Alcohol use: Not Currently    Drug use: Never    Sexual activity: Yes     Partners: Male     Birth control/protection: None   Social History Narrative    She wears seatbelt.     Social Determinants of Health     Financial Resource Strain: Low Risk     Difficulty of Paying Living Expenses: Not very hard   Food Insecurity: No Food Insecurity    Worried About Running Out of Food in the Last Year: Never true    Ran Out of Food in the Last Year: Never true   Transportation Needs: No Transportation Needs    Lack of Transportation (Medical): No    Lack of Transportation (Non-Medical): No    Physical Activity: Insufficiently Active    Days of Exercise per Week: 3 days    Minutes of Exercise per Session: 30 min   Stress: Stress Concern Present    Feeling of Stress : Very much   Social Connections: Unknown    Frequency of Communication with Friends and Family: More than three times a week    Frequency of Social Gatherings with Friends and Family: Once a week    Active Member of Clubs or Organizations: No    Attends Club or Organization Meetings: Patient refused    Marital Status: Living with partner   Housing Stability: Low Risk     Unable to Pay for Housing in the Last Year: No    Number of Places Lived in the Last Year: 1    Unstable Housing in the Last Year: No     Patient Active Problem List   Diagnosis    Anxiety and depression    Insomnia    Severe obesity (BMI 35.0-39.9) with comorbidity     Review of patient's allergies indicates:  No Known Allergies    The following were reviewed at this visit: active problem list, medication list, allergies, family history, social history, and health maintenance.    Medications:  Current Outpatient Medications on File Prior to Visit   Medication Sig Dispense Refill    norethindrone-ethinyl estradiol (MICROGESTIN 1/20) 1-20 mg-mcg per tablet Take 1 tablet by mouth once daily. 21 tablet 15    [DISCONTINUED] FLUoxetine 20 MG capsule Take 1 capsule (20 mg total) by mouth once daily. 90 capsule 1     No current facility-administered medications on file prior to visit.       Medications have been reviewed and reconciled with patient at this visit.  Barriers to medications reviewed with patient.    Adverse reactions to current medications reviewed with patient..    Over the counter medications reviewed and reconciled with patient.    Exam:  Wt Readings from Last 3 Encounters:   07/06/23 102 kg (224 lb 13.9 oz)   12/30/22 102 kg (224 lb 13.9 oz)   09/26/22 99.8 kg (220 lb 0.3 oz)     Temp Readings from Last 3 Encounters:   05/31/22 96.5 °F (35.8 °C)   12/02/21 98.8 °F  (37.1 °C)   05/31/21 98.6 °F (37 °C) (Temporal)     BP Readings from Last 3 Encounters:   07/06/23 118/84   12/30/22 122/76   09/26/22 110/78     Pulse Readings from Last 3 Encounters:   07/06/23 90   05/31/22 100   12/02/21 80     Body mass index is 39.83 kg/m².      Review of Systems   Constitutional:  Negative for fever.   Respiratory:  Negative for cough, shortness of breath and wheezing.    Cardiovascular:  Negative for chest pain and palpitations.   Gastrointestinal:  Negative for nausea.   Neurological:  Negative for speech change, weakness and headaches.   Psychiatric/Behavioral:  Positive for depression.    All other systems reviewed and are negative.  Physical Exam  Vitals and nursing note reviewed.   Constitutional:       Appearance: Normal appearance. She is obese.   HENT:      Head: Normocephalic and atraumatic.      Right Ear: Tympanic membrane, ear canal and external ear normal.      Left Ear: Tympanic membrane, ear canal and external ear normal.      Nose: Nose normal.      Mouth/Throat:      Mouth: Mucous membranes are moist.      Pharynx: Oropharynx is clear.   Eyes:      Extraocular Movements: Extraocular movements intact.      Conjunctiva/sclera: Conjunctivae normal.      Pupils: Pupils are equal, round, and reactive to light.   Cardiovascular:      Rate and Rhythm: Normal rate and regular rhythm.      Pulses: Normal pulses.      Heart sounds: Normal heart sounds.   Pulmonary:      Effort: Pulmonary effort is normal.      Breath sounds: Normal breath sounds.   Abdominal:      General: Bowel sounds are normal.      Palpations: Abdomen is soft.   Musculoskeletal:         General: Normal range of motion.      Cervical back: Normal range of motion and neck supple.   Skin:     General: Skin is warm and dry.      Capillary Refill: Capillary refill takes less than 2 seconds.   Neurological:      General: No focal deficit present.      Mental Status: She is alert and oriented to person, place, and time.    Psychiatric:         Mood and Affect: Mood normal.         Behavior: Behavior normal.         Thought Content: Thought content normal.         Judgment: Judgment normal.       Laboratory Reviewed ({Yes)  Lab Results   Component Value Date    WBC 5.87 06/03/2022    HGB 14.2 06/03/2022    HCT 45.0 06/03/2022     06/03/2022    CHOL 211 (H) 06/03/2022    TRIG 58 06/03/2022    HDL 52 06/03/2022    ALT 16 06/03/2022    AST 11 06/03/2022     06/03/2022    K 4.4 06/03/2022     06/03/2022    CREATININE 0.8 06/03/2022    BUN 5 (L) 06/03/2022    CO2 22 (L) 06/03/2022    TSH 0.777 12/30/2022    HGBA1C 5.3 06/03/2022       Radames was seen today for annual exam.    Diagnoses and all orders for this visit:    Anxiety and depression  -     FLUoxetine 40 MG capsule; Take 1 capsule (40 mg total) by mouth once daily.    Obesity (BMI 30.0-34.9)    Severe obesity (BMI 35.0-39.9) with comorbidity  -     Lipid Panel; Future    Healthcare maintenance  -     Lipid Panel; Future  -     T4, Free; Future  -     TSH; Future  -     Hemoglobin A1C; Future  -     Comprehensive Metabolic Panel; Future  -     CBC Auto Differential; Future        -Follow up in two weeks virtually after prozac increase   -Start a low-fat diet.    Exercise 45 minutes a day for 4-5 days a week.    Follow up in 6 months for repeat fasting labs.       Care Plan/Goals: Reviewed    Goals    None       Radames was seen today for annual exam.    Diagnoses and all orders for this visit:    Anxiety and depression  -     FLUoxetine 40 MG capsule; Take 1 capsule (40 mg total) by mouth once daily.    Obesity (BMI 30.0-34.9)    Severe obesity (BMI 35.0-39.9) with comorbidity  -     Lipid Panel; Future    Healthcare maintenance  -     Lipid Panel; Future  -     T4, Free; Future  -     TSH; Future  -     Hemoglobin A1C; Future  -     Comprehensive Metabolic Panel; Future  -     CBC Auto Differential; Future       Follow up: Follow up in about 15 days (around  7/21/2023) for prozac increase .    After visit summary was printed and given to patient upon discharge today.  Patient goals and care plan are included in After Visit Summary.

## 2023-07-10 ENCOUNTER — PATIENT MESSAGE (OUTPATIENT)
Dept: FAMILY MEDICINE | Facility: CLINIC | Age: 33
End: 2023-07-10
Payer: MEDICAID

## 2023-07-24 ENCOUNTER — PATIENT MESSAGE (OUTPATIENT)
Dept: RESEARCH | Facility: HOSPITAL | Age: 33
End: 2023-07-24
Payer: MEDICAID

## 2023-10-24 ENCOUNTER — LAB VISIT (OUTPATIENT)
Dept: LAB | Facility: HOSPITAL | Age: 33
End: 2023-10-24
Attending: NURSE PRACTITIONER
Payer: MEDICAID

## 2023-10-24 ENCOUNTER — OFFICE VISIT (OUTPATIENT)
Dept: OBSTETRICS AND GYNECOLOGY | Facility: CLINIC | Age: 33
End: 2023-10-24
Payer: MEDICAID

## 2023-10-24 VITALS
SYSTOLIC BLOOD PRESSURE: 110 MMHG | DIASTOLIC BLOOD PRESSURE: 64 MMHG | WEIGHT: 231.94 LBS | HEIGHT: 63 IN | BODY MASS INDEX: 41.1 KG/M2

## 2023-10-24 DIAGNOSIS — Z11.3 SCREEN FOR STD (SEXUALLY TRANSMITTED DISEASE): ICD-10-CM

## 2023-10-24 DIAGNOSIS — N64.4 BREAST PAIN: ICD-10-CM

## 2023-10-24 DIAGNOSIS — N91.1 SECONDARY AMENORRHEA: ICD-10-CM

## 2023-10-24 DIAGNOSIS — Z01.419 ROUTINE GYNECOLOGICAL EXAMINATION: Primary | ICD-10-CM

## 2023-10-24 LAB
HAV IGM SERPL QL IA: NORMAL
HBV CORE IGM SERPL QL IA: NORMAL
HBV SURFACE AG SERPL QL IA: NORMAL
HCV AB SERPL QL IA: NORMAL
HIV 1+2 AB+HIV1 P24 AG SERPL QL IA: NORMAL

## 2023-10-24 PROCEDURE — 1160F RVW MEDS BY RX/DR IN RCRD: CPT | Mod: CPTII,,, | Performed by: NURSE PRACTITIONER

## 2023-10-24 PROCEDURE — 3008F BODY MASS INDEX DOCD: CPT | Mod: CPTII,,, | Performed by: NURSE PRACTITIONER

## 2023-10-24 PROCEDURE — 3074F SYST BP LT 130 MM HG: CPT | Mod: CPTII,,, | Performed by: NURSE PRACTITIONER

## 2023-10-24 PROCEDURE — 99395 PR PREVENTIVE VISIT,EST,18-39: ICD-10-PCS | Mod: 25,S$PBB,, | Performed by: NURSE PRACTITIONER

## 2023-10-24 PROCEDURE — 87591 N.GONORRHOEAE DNA AMP PROB: CPT | Performed by: NURSE PRACTITIONER

## 2023-10-24 PROCEDURE — 1159F MED LIST DOCD IN RCRD: CPT | Mod: CPTII,,, | Performed by: NURSE PRACTITIONER

## 2023-10-24 PROCEDURE — 1160F PR REVIEW ALL MEDS BY PRESCRIBER/CLIN PHARMACIST DOCUMENTED: ICD-10-PCS | Mod: CPTII,,, | Performed by: NURSE PRACTITIONER

## 2023-10-24 PROCEDURE — 99395 PREV VISIT EST AGE 18-39: CPT | Mod: 25,S$PBB,, | Performed by: NURSE PRACTITIONER

## 2023-10-24 PROCEDURE — 3078F DIAST BP <80 MM HG: CPT | Mod: CPTII,,, | Performed by: NURSE PRACTITIONER

## 2023-10-24 PROCEDURE — 36415 COLL VENOUS BLD VENIPUNCTURE: CPT | Performed by: NURSE PRACTITIONER

## 2023-10-24 PROCEDURE — 3074F PR MOST RECENT SYSTOLIC BLOOD PRESSURE < 130 MM HG: ICD-10-PCS | Mod: CPTII,,, | Performed by: NURSE PRACTITIONER

## 2023-10-24 PROCEDURE — 87491 CHLMYD TRACH DNA AMP PROBE: CPT | Performed by: NURSE PRACTITIONER

## 2023-10-24 PROCEDURE — 3044F PR MOST RECENT HEMOGLOBIN A1C LEVEL <7.0%: ICD-10-PCS | Mod: CPTII,,, | Performed by: NURSE PRACTITIONER

## 2023-10-24 PROCEDURE — 99999 PR PBB SHADOW E&M-EST. PATIENT-LVL III: CPT | Mod: PBBFAC,,, | Performed by: NURSE PRACTITIONER

## 2023-10-24 PROCEDURE — 1159F PR MEDICATION LIST DOCUMENTED IN MEDICAL RECORD: ICD-10-PCS | Mod: CPTII,,, | Performed by: NURSE PRACTITIONER

## 2023-10-24 PROCEDURE — 99999 PR PBB SHADOW E&M-EST. PATIENT-LVL III: ICD-10-PCS | Mod: PBBFAC,,, | Performed by: NURSE PRACTITIONER

## 2023-10-24 PROCEDURE — 3078F PR MOST RECENT DIASTOLIC BLOOD PRESSURE < 80 MM HG: ICD-10-PCS | Mod: CPTII,,, | Performed by: NURSE PRACTITIONER

## 2023-10-24 PROCEDURE — 87389 HIV-1 AG W/HIV-1&-2 AB AG IA: CPT | Performed by: NURSE PRACTITIONER

## 2023-10-24 PROCEDURE — 80074 ACUTE HEPATITIS PANEL: CPT | Performed by: NURSE PRACTITIONER

## 2023-10-24 PROCEDURE — 3044F HG A1C LEVEL LT 7.0%: CPT | Mod: CPTII,,, | Performed by: NURSE PRACTITIONER

## 2023-10-24 PROCEDURE — 3008F PR BODY MASS INDEX (BMI) DOCUMENTED: ICD-10-PCS | Mod: CPTII,,, | Performed by: NURSE PRACTITIONER

## 2023-10-24 PROCEDURE — 99213 OFFICE O/P EST LOW 20 MIN: CPT | Mod: PBBFAC | Performed by: NURSE PRACTITIONER

## 2023-10-24 PROCEDURE — 86592 SYPHILIS TEST NON-TREP QUAL: CPT | Performed by: NURSE PRACTITIONER

## 2023-10-24 RX ORDER — MEDROXYPROGESTERONE ACETATE 10 MG/1
10 TABLET ORAL DAILY
Qty: 10 TABLET | Refills: 0 | Status: SHIPPED | OUTPATIENT
Start: 2023-10-24 | End: 2024-04-01

## 2023-10-24 NOTE — PROGRESS NOTES
"  Subjective:       Patient ID: Radames Cota is a 33 y.o. female.    Chief Complaint:  WWE    History of Present Illness  HPI  Complains of random Left breast tenderness that has been occurring times one year  Desires std screening   Desires prescription for provera (secondary amenorrhea) she has not had a cycle since 2023 and she is not on hormonal birth control   Health Maintenance   Topic Date Due    TETANUS VACCINE  2028    Hepatitis C Screening  Completed    Lipid Panel  Completed     GYN & OB History  Patient's last menstrual period was 2023 (exact date).   Date of Last Pap: 2022    OB History    Para Term  AB Living   4 4 2 2   4   SAB IAB Ectopic Multiple Live Births           4      # Outcome Date GA Lbr Carter/2nd Weight Sex Delivery Anes PTL Lv   4  10/05/18 36w0d   F Vag-Spont EPI  NAJMA   3 Term 03/04/15 37w0d   F Vag-Spont EPI  NAJMA   2  12 28w0d   M Vag-Spont EPI Y ANJMA   1 Term 11    M Vag-Spont EPI  NAJMA       Review of Systems  Review of Systems        Objective:   /64   Ht 5' 3" (1.6 m)   Wt 105.2 kg (231 lb 14.8 oz)   LMP 2023 (Exact Date)   BMI 41.08 kg/m²    Physical Exam:   Constitutional: She is oriented to person, place, and time. She appears well-developed and well-nourished.        Pulmonary/Chest: Right breast exhibits no inverted nipple, no mass, no nipple discharge, no skin change, no tenderness and no swelling. Left breast exhibits no inverted nipple, no mass, no nipple discharge, no skin change, no tenderness and no swelling.        Abdominal: Soft.     Genitourinary:    Inguinal canal and vagina normal.      Pelvic exam was performed with patient supine.   The external female genitalia was normal.   Genitalia hair distrobution normal .   Labial bartholins normal.No erythema,  no vaginal discharge, bleeding, rectocele, cystocele or unspecified prolapse of vaginal walls in the vagina.    No foreign body in the vagina.     "  No signs of injury in the vagina.                 Neurological: She is alert and oriented to person, place, and time.    Skin: Skin is warm and dry.    Psychiatric: She has a normal mood and affect. Her behavior is normal. Judgment and thought content normal.        Assessment:        1. Routine gynecological examination    2. Screen for STD (sexually transmitted disease)    3. Breast pain    4. Secondary amenorrhea               Plan:           Radames was seen today for irregular cycles.    Diagnoses and all orders for this visit:    Routine gynecological examination    Screen for STD (sexually transmitted disease)  -     C. trachomatis/N. gonorrhoeae by AMP DNA [facility]; Urine  -     HIV 1/2 Ag/Ab (4th Gen); Future  -     RPR; Future  -     Hepatitis Panel, Acute; Future    Breast pain  -     Mammo Digital Diagnostic Left with Jaavd; Future  -     US Breast Left Complete; Future    Secondary amenorrhea  -     medroxyPROGESTERone (PROVERA) 10 MG tablet; Take 1 tablet (10 mg total) by mouth once daily.    Verbalizes understanding that if she is NOT on hormonal birth control she must have a cycle at least every 3 to 6 months in order to decrease risk of uterine cancer, uterine  fibroids, or uterine tumors. If she not on hormonal birth control and she does not have a cycle at least every 3-6 months she must be prescribed provera for 10 days to induce a cycle. Verbalizes understanding that prior to taking this medication she MUST do a UPT prior to taking provera to ensure she is not pregnancy prior to ingesting provera. If she does not have a cycle 14 days after taking her last provera pill, she must rtc for further evaluation of amenorrhea.    Verbalizes understanding that if she is ON a hormonal birth control. It does not matter whether she has a cycle or not.      Return to clinic in one year for [id number]

## 2023-11-01 DIAGNOSIS — F32.A ANXIETY AND DEPRESSION: ICD-10-CM

## 2023-11-01 DIAGNOSIS — F41.9 ANXIETY AND DEPRESSION: ICD-10-CM

## 2023-11-01 RX ORDER — FLUOXETINE HYDROCHLORIDE 40 MG/1
40 CAPSULE ORAL DAILY
Qty: 30 CAPSULE | Refills: 2 | Status: SHIPPED | OUTPATIENT
Start: 2023-11-01 | End: 2024-02-09 | Stop reason: SDUPTHER

## 2023-11-07 ENCOUNTER — HOSPITAL ENCOUNTER (OUTPATIENT)
Dept: RADIOLOGY | Facility: HOSPITAL | Age: 33
Discharge: HOME | End: 2023-11-07
Attending: NURSE PRACTITIONER
Payer: MEDICAID

## 2023-11-07 DIAGNOSIS — N64.4 BREAST PAIN: ICD-10-CM

## 2023-11-07 PROCEDURE — 77066 DX MAMMO INCL CAD BI: CPT | Mod: 26,,, | Performed by: RADIOLOGY

## 2023-11-07 PROCEDURE — 76641 ULTRASOUND BREAST COMPLETE: CPT | Mod: 26,LT,, | Performed by: RADIOLOGY

## 2023-11-07 PROCEDURE — 77066 MAMMO DIGITAL DIAGNOSTIC BILAT WITH TOMO: ICD-10-PCS | Mod: 26,,, | Performed by: RADIOLOGY

## 2023-11-07 PROCEDURE — 76641 US BREAST LEFT COMPLETE: ICD-10-PCS | Mod: 26,LT,, | Performed by: RADIOLOGY

## 2023-11-07 PROCEDURE — 77062 BREAST TOMOSYNTHESIS BI: CPT | Mod: 26,,, | Performed by: RADIOLOGY

## 2023-11-07 PROCEDURE — 77062 BREAST TOMOSYNTHESIS BI: CPT | Mod: TC

## 2023-11-07 PROCEDURE — 76641 ULTRASOUND BREAST COMPLETE: CPT | Mod: TC,LT

## 2023-11-07 PROCEDURE — 77062 MAMMO DIGITAL DIAGNOSTIC BILAT WITH TOMO: ICD-10-PCS | Mod: 26,,, | Performed by: RADIOLOGY

## 2023-12-14 NOTE — PROGRESS NOTES
Subjective     Patient ID: Radames Cota is a 33 y.o. female.    Chief Complaint: Leg Pain and Back Pain      HPI  Patient presents due to back and left leg pain. She reports she has had this pain for 2 weeks. She went to urgent care last week where they stated it is sciatica and gave her steroids and a muscle relaxer.   Review of Systems   Constitutional:  Negative for chills and fatigue.   Respiratory:  Negative for chest tightness and shortness of breath.    Cardiovascular:  Negative for chest pain and palpitations.   Gastrointestinal:  Negative for abdominal pain, constipation, diarrhea, nausea and vomiting.   Genitourinary:  Negative for frequency and urgency.   Musculoskeletal:  Positive for back pain and leg pain.   Neurological:  Negative for dizziness, numbness and headaches.          Objective       Current Outpatient Medications:     FLUoxetine 40 MG capsule, Take 1 capsule (40 mg total) by mouth once daily., Disp: 30 capsule, Rfl: 2    ketorolac (TORADOL) 10 mg tablet, Take 10 mg by mouth., Disp: , Rfl:     methocarbamoL (ROBAXIN) 750 MG Tab, Take 750 mg by mouth 3 (three) times daily as needed., Disp: , Rfl:     medroxyPROGESTERone (PROVERA) 10 MG tablet, Take 1 tablet (10 mg total) by mouth once daily. (Patient not taking: Reported on 12/18/2023), Disp: 10 tablet, Rfl: 0    predniSONE (DELTASONE) 20 MG tablet, Take 40 mg by mouth every morning., Disp: , Rfl:      Physical Exam  Constitutional:       General: She is not in acute distress.     Appearance: Normal appearance. She is obese.   HENT:      Head: Normocephalic and atraumatic.   Cardiovascular:      Rate and Rhythm: Normal rate and regular rhythm.      Heart sounds: Normal heart sounds. No murmur heard.     No friction rub. No gallop.   Pulmonary:      Effort: Pulmonary effort is normal.      Breath sounds: Normal breath sounds. No wheezing, rhonchi or rales.   Abdominal:      General: Bowel sounds are normal. There is no distension.       Palpations: Abdomen is soft.      Tenderness: There is no abdominal tenderness. There is no rebound.   Skin:     General: Skin is warm and dry.      Coloration: Skin is not jaundiced.   Neurological:      General: No focal deficit present.      Mental Status: She is alert and oriented to person, place, and time. Mental status is at baseline.   Psychiatric:         Mood and Affect: Mood normal.         Behavior: Behavior normal.            Assessment and Plan     1. Acute left-sided low back pain with left-sided sciatica  -     Ambulatory referral/consult to Physical/Occupational Therapy; Future; Expected date: 12/25/2023      Advised patient to continue steroids and muscle relaxer  Will refer patient to physical therapy         No follow-ups on file.

## 2023-12-18 ENCOUNTER — OFFICE VISIT (OUTPATIENT)
Dept: FAMILY MEDICINE | Facility: CLINIC | Age: 33
End: 2023-12-18
Payer: MEDICAID

## 2023-12-18 VITALS
TEMPERATURE: 98 F | SYSTOLIC BLOOD PRESSURE: 126 MMHG | OXYGEN SATURATION: 96 % | DIASTOLIC BLOOD PRESSURE: 70 MMHG | BODY MASS INDEX: 38.85 KG/M2 | HEART RATE: 82 BPM | WEIGHT: 219.25 LBS | HEIGHT: 63 IN

## 2023-12-18 DIAGNOSIS — M54.42 ACUTE LEFT-SIDED LOW BACK PAIN WITH LEFT-SIDED SCIATICA: Primary | ICD-10-CM

## 2023-12-18 PROCEDURE — 3044F PR MOST RECENT HEMOGLOBIN A1C LEVEL <7.0%: ICD-10-PCS | Mod: CPTII,,, | Performed by: INTERNAL MEDICINE

## 2023-12-18 PROCEDURE — 3078F PR MOST RECENT DIASTOLIC BLOOD PRESSURE < 80 MM HG: ICD-10-PCS | Mod: CPTII,,, | Performed by: INTERNAL MEDICINE

## 2023-12-18 PROCEDURE — 3074F SYST BP LT 130 MM HG: CPT | Mod: CPTII,,, | Performed by: INTERNAL MEDICINE

## 2023-12-18 PROCEDURE — 99999 PR PBB SHADOW E&M-EST. PATIENT-LVL III: CPT | Mod: PBBFAC,,, | Performed by: INTERNAL MEDICINE

## 2023-12-18 PROCEDURE — 1159F PR MEDICATION LIST DOCUMENTED IN MEDICAL RECORD: ICD-10-PCS | Mod: CPTII,,, | Performed by: INTERNAL MEDICINE

## 2023-12-18 PROCEDURE — 3078F DIAST BP <80 MM HG: CPT | Mod: CPTII,,, | Performed by: INTERNAL MEDICINE

## 2023-12-18 PROCEDURE — 99212 PR OFFICE/OUTPT VISIT, EST, LEVL II, 10-19 MIN: ICD-10-PCS | Mod: S$PBB,,, | Performed by: INTERNAL MEDICINE

## 2023-12-18 PROCEDURE — 99999 PR PBB SHADOW E&M-EST. PATIENT-LVL III: ICD-10-PCS | Mod: PBBFAC,,, | Performed by: INTERNAL MEDICINE

## 2023-12-18 PROCEDURE — 99212 OFFICE O/P EST SF 10 MIN: CPT | Mod: S$PBB,,, | Performed by: INTERNAL MEDICINE

## 2023-12-18 PROCEDURE — 3008F PR BODY MASS INDEX (BMI) DOCUMENTED: ICD-10-PCS | Mod: CPTII,,, | Performed by: INTERNAL MEDICINE

## 2023-12-18 PROCEDURE — 3074F PR MOST RECENT SYSTOLIC BLOOD PRESSURE < 130 MM HG: ICD-10-PCS | Mod: CPTII,,, | Performed by: INTERNAL MEDICINE

## 2023-12-18 PROCEDURE — 99213 OFFICE O/P EST LOW 20 MIN: CPT | Mod: PBBFAC,PO | Performed by: INTERNAL MEDICINE

## 2023-12-18 PROCEDURE — 1159F MED LIST DOCD IN RCRD: CPT | Mod: CPTII,,, | Performed by: INTERNAL MEDICINE

## 2023-12-18 PROCEDURE — 3044F HG A1C LEVEL LT 7.0%: CPT | Mod: CPTII,,, | Performed by: INTERNAL MEDICINE

## 2023-12-18 PROCEDURE — 3008F BODY MASS INDEX DOCD: CPT | Mod: CPTII,,, | Performed by: INTERNAL MEDICINE

## 2023-12-18 RX ORDER — KETOROLAC TROMETHAMINE 10 MG/1
10 TABLET, FILM COATED ORAL
COMMUNITY
Start: 2023-12-13

## 2023-12-18 RX ORDER — METHOCARBAMOL 750 MG/1
750 TABLET, FILM COATED ORAL 3 TIMES DAILY PRN
COMMUNITY
Start: 2023-12-13

## 2023-12-18 RX ORDER — PREDNISONE 20 MG/1
40 TABLET ORAL EVERY MORNING
COMMUNITY
Start: 2023-12-13

## 2024-02-02 ENCOUNTER — TELEPHONE (OUTPATIENT)
Dept: PSYCHIATRY | Facility: CLINIC | Age: 34
End: 2024-02-02
Payer: MEDICAID

## 2024-02-09 DIAGNOSIS — F32.A ANXIETY AND DEPRESSION: ICD-10-CM

## 2024-02-09 DIAGNOSIS — F41.9 ANXIETY AND DEPRESSION: ICD-10-CM

## 2024-02-09 RX ORDER — FLUOXETINE HYDROCHLORIDE 40 MG/1
40 CAPSULE ORAL DAILY
Qty: 30 CAPSULE | Refills: 2 | Status: SHIPPED | OUTPATIENT
Start: 2024-02-09 | End: 2024-06-18 | Stop reason: SDUPTHER

## 2024-02-09 NOTE — TELEPHONE ENCOUNTER
No care due was identified.  [facility] Embedded Care Due Messages. Reference number: [id number].   2/09/2024 10:40:47 AM CST

## 2024-03-25 ENCOUNTER — TELEPHONE (OUTPATIENT)
Dept: FAMILY MEDICINE | Facility: CLINIC | Age: 34
End: 2024-03-25
Payer: MEDICAID

## 2024-04-01 DIAGNOSIS — N91.1 SECONDARY AMENORRHEA: ICD-10-CM

## 2024-04-01 RX ORDER — NORETHINDRONE ACETATE AND ETHINYL ESTRADIOL .02; 1 MG/1; MG/1
1 TABLET ORAL
Qty: 21 TABLET | Refills: 8 | Status: SHIPPED | OUTPATIENT
Start: 2024-04-01 | End: 2024-06-18

## 2024-06-18 ENCOUNTER — OFFICE VISIT (OUTPATIENT)
Dept: FAMILY MEDICINE | Facility: CLINIC | Age: 34
End: 2024-06-18
Attending: FAMILY MEDICINE
Payer: MEDICAID

## 2024-06-18 ENCOUNTER — LAB VISIT (OUTPATIENT)
Dept: LAB | Facility: HOSPITAL | Age: 34
End: 2024-06-18
Attending: FAMILY MEDICINE
Payer: MEDICAID

## 2024-06-18 VITALS
HEIGHT: 63 IN | OXYGEN SATURATION: 99 % | HEART RATE: 89 BPM | DIASTOLIC BLOOD PRESSURE: 70 MMHG | SYSTOLIC BLOOD PRESSURE: 122 MMHG | RESPIRATION RATE: 18 BRPM | TEMPERATURE: 99 F | BODY MASS INDEX: 32.03 KG/M2 | WEIGHT: 180.75 LBS

## 2024-06-18 DIAGNOSIS — E66.9 OBESITY (BMI 30.0-34.9): ICD-10-CM

## 2024-06-18 DIAGNOSIS — F32.A ANXIETY AND DEPRESSION: ICD-10-CM

## 2024-06-18 DIAGNOSIS — Z00.00 ANNUAL PHYSICAL EXAM: Primary | ICD-10-CM

## 2024-06-18 DIAGNOSIS — Z00.00 ANNUAL PHYSICAL EXAM: ICD-10-CM

## 2024-06-18 DIAGNOSIS — R53.83 FATIGUE, UNSPECIFIED TYPE: ICD-10-CM

## 2024-06-18 DIAGNOSIS — F41.9 ANXIETY AND DEPRESSION: ICD-10-CM

## 2024-06-18 LAB
25(OH)D3+25(OH)D2 SERPL-MCNC: 28 NG/ML (ref 30–96)
ALBUMIN SERPL BCP-MCNC: 3.9 G/DL (ref 3.5–5.2)
ALP SERPL-CCNC: 41 U/L (ref 55–135)
ALT SERPL W/O P-5'-P-CCNC: 8 U/L (ref 10–44)
ANION GAP SERPL CALC-SCNC: 6 MMOL/L (ref 8–16)
AST SERPL-CCNC: 9 U/L (ref 10–40)
BASOPHILS # BLD AUTO: 0.05 K/UL (ref 0–0.2)
BASOPHILS NFR BLD: 0.9 % (ref 0–1.9)
BILIRUB SERPL-MCNC: 0.5 MG/DL (ref 0.1–1)
BUN SERPL-MCNC: 3 MG/DL (ref 6–20)
CALCIUM SERPL-MCNC: 9.1 MG/DL (ref 8.7–10.5)
CHLORIDE SERPL-SCNC: 106 MMOL/L (ref 95–110)
CHOLEST SERPL-MCNC: 190 MG/DL (ref 120–199)
CHOLEST/HDLC SERPL: 4.5 {RATIO} (ref 2–5)
CO2 SERPL-SCNC: 26 MMOL/L (ref 23–29)
CREAT SERPL-MCNC: 0.7 MG/DL (ref 0.5–1.4)
DIFFERENTIAL METHOD BLD: ABNORMAL
EOSINOPHIL # BLD AUTO: 0 K/UL (ref 0–0.5)
EOSINOPHIL NFR BLD: 0.5 % (ref 0–8)
ERYTHROCYTE [DISTWIDTH] IN BLOOD BY AUTOMATED COUNT: 14.2 % (ref 11.5–14.5)
EST. GFR  (NO RACE VARIABLE): >60 ML/MIN/1.73 M^2
GLUCOSE SERPL-MCNC: 79 MG/DL (ref 70–110)
HCT VFR BLD AUTO: 39.3 % (ref 37–48.5)
HDLC SERPL-MCNC: 42 MG/DL (ref 40–75)
HDLC SERPL: 22.1 % (ref 20–50)
HGB BLD-MCNC: 13.1 G/DL (ref 12–16)
IMM GRANULOCYTES # BLD AUTO: 0.01 K/UL (ref 0–0.04)
IMM GRANULOCYTES NFR BLD AUTO: 0.2 % (ref 0–0.5)
LDLC SERPL CALC-MCNC: 134.2 MG/DL (ref 63–159)
LYMPHOCYTES # BLD AUTO: 2.6 K/UL (ref 1–4.8)
LYMPHOCYTES NFR BLD: 43.9 % (ref 18–48)
MCH RBC QN AUTO: 28 PG (ref 27–31)
MCHC RBC AUTO-ENTMCNC: 33.3 G/DL (ref 32–36)
MCV RBC AUTO: 84 FL (ref 82–98)
MONOCYTES # BLD AUTO: 0.5 K/UL (ref 0.3–1)
MONOCYTES NFR BLD: 8.4 % (ref 4–15)
NEUTROPHILS # BLD AUTO: 2.7 K/UL (ref 1.8–7.7)
NEUTROPHILS NFR BLD: 46.1 % (ref 38–73)
NONHDLC SERPL-MCNC: 148 MG/DL
NRBC BLD-RTO: 0 /100 WBC
PLATELET # BLD AUTO: 254 K/UL (ref 150–450)
PMV BLD AUTO: 13 FL (ref 9.2–12.9)
POTASSIUM SERPL-SCNC: 3.7 MMOL/L (ref 3.5–5.1)
PROT SERPL-MCNC: 7 G/DL (ref 6–8.4)
RBC # BLD AUTO: 4.68 M/UL (ref 4–5.4)
SODIUM SERPL-SCNC: 138 MMOL/L (ref 136–145)
TRIGL SERPL-MCNC: 69 MG/DL (ref 30–150)
WBC # BLD AUTO: 5.83 K/UL (ref 3.9–12.7)

## 2024-06-18 PROCEDURE — 99999 PR PBB SHADOW E&M-EST. PATIENT-LVL III: CPT | Mod: PBBFAC,,, | Performed by: FAMILY MEDICINE

## 2024-06-18 PROCEDURE — 3008F BODY MASS INDEX DOCD: CPT | Mod: CPTII,,, | Performed by: FAMILY MEDICINE

## 2024-06-18 PROCEDURE — 1160F RVW MEDS BY RX/DR IN RCRD: CPT | Mod: CPTII,,, | Performed by: FAMILY MEDICINE

## 2024-06-18 PROCEDURE — 82306 VITAMIN D 25 HYDROXY: CPT | Performed by: FAMILY MEDICINE

## 2024-06-18 PROCEDURE — 85025 COMPLETE CBC W/AUTO DIFF WBC: CPT | Performed by: FAMILY MEDICINE

## 2024-06-18 PROCEDURE — 36415 COLL VENOUS BLD VENIPUNCTURE: CPT | Mod: PO | Performed by: FAMILY MEDICINE

## 2024-06-18 PROCEDURE — 80053 COMPREHEN METABOLIC PANEL: CPT | Performed by: FAMILY MEDICINE

## 2024-06-18 PROCEDURE — 82728 ASSAY OF FERRITIN: CPT | Performed by: FAMILY MEDICINE

## 2024-06-18 PROCEDURE — 99395 PREV VISIT EST AGE 18-39: CPT | Mod: S$PBB,,, | Performed by: FAMILY MEDICINE

## 2024-06-18 PROCEDURE — 80061 LIPID PANEL: CPT | Performed by: FAMILY MEDICINE

## 2024-06-18 PROCEDURE — 3074F SYST BP LT 130 MM HG: CPT | Mod: CPTII,,, | Performed by: FAMILY MEDICINE

## 2024-06-18 PROCEDURE — 1159F MED LIST DOCD IN RCRD: CPT | Mod: CPTII,,, | Performed by: FAMILY MEDICINE

## 2024-06-18 PROCEDURE — 3078F DIAST BP <80 MM HG: CPT | Mod: CPTII,,, | Performed by: FAMILY MEDICINE

## 2024-06-18 PROCEDURE — 99213 OFFICE O/P EST LOW 20 MIN: CPT | Mod: PBBFAC,PO | Performed by: FAMILY MEDICINE

## 2024-06-18 PROCEDURE — 84443 ASSAY THYROID STIM HORMONE: CPT | Performed by: FAMILY MEDICINE

## 2024-06-18 RX ORDER — FLUOXETINE HYDROCHLORIDE 40 MG/1
40 CAPSULE ORAL DAILY
Qty: 90 CAPSULE | Refills: 2 | Status: SHIPPED | OUTPATIENT
Start: 2024-06-18

## 2024-06-18 NOTE — PROGRESS NOTES
HISTORY OF PRESENT ILLNESS:  Ms. Cota comes in today for annual wellness examination.  She is not fasting and has taken medications today.     END OF LIFE DECISION: She does not have a living will. She does desire life support.  .  Current Outpatient Medications   Medication Sig    FLUoxetine 40 MG capsule Take 1 capsule (40 mg total) by mouth once daily.      SCREENINGS:    LDLCALC: July 6, 2023.   Colonoscopy: Never.  Mammogram: Never.  Eye Exam: June 2024 at [facility]. She wears glasses.  GYN Exam: October 24, 2023 - naty. September 26, 2022 pap smear with GYN NP Oralia Fong - naty.   PPD: Never.        Immunization History   Administered Date(s) Administered    COVID-19, MRNA, LN-S, PF (Pfizer) (Purple Cap) 06/01/2021, 07/02/2021    DTaP 1992, 01/19/1993, 03/11/1993, 02/14/1994, 10/12/1996    HIB 1992, 01/19/1993, 03/11/1993, 02/14/1994    Hepatitis B, Pediatric/Adolescent 1992, 01/19/1993, 05/06/1993    IPV 1992, 01/19/1993, 02/14/1994, 12/12/1996    Influenza - Quadrivalent - PF *Preferred* (6 months and older) 03/05/2015    MMR 02/14/1994, 12/12/1996    Meningococcal Conjugate (MCV4P) 03/18/2010    Tdap 03/18/2010, 03/05/2015, 07/30/2018   Flu shot: In the past per patient.                                            ROS:  GENERAL: Denies fever, chills, or unusual weight change. Reports fatigue and sleeps day a way on increased Prozac dose. Appetite normal. Exercises does by walking 2 times per week, 30 to 45 minutes. Monitors diet does. Weight 102 kg (224 lb 13.9 oz) at July 6, 2023 visit  SKIN: Denies rashes, itching, changes in mole, color or texture of skin or easy bruising.  HEAD:  Denies recent head trauma or headaches.  EYES: Denies change in vision, pain, diplopia, redness or discharge.  EARS: Denies ear pain, discharge, vertigo or decreased hearing.  NOSE: Denies loss of smell, epistaxis or rhinitis.  MOUTH & THROAT: Denies hoarseness or change in voice. Denies excessive  "gum bleeding or mouth sores.  Denies sore throat.  NODES: Denies swollen glands.  CHEST: Denies MCQUEEN, wheezing, cough, or sputum production.  CARDIOVASCULAR: Denies chest pain, PND, orthopnea or reduced exercise tolerance.  Denies palpitations.  ABDOMEN: Denies diarrhea, constipation, nausea, vomiting, abdominal pain, or blood in stool.  URINARY: Denies flank pain, dysuria or hematuria.  GENITOURINARY: Denies flank pain, dysuria, frequency or hematuria. No change in menses. Performs monthly breast exams does.  ENDOCRINE: Denies diabetes, thyroid, cholesterol problems. Performs home glucose checks N./A.   HEME/LYMPH: Denies bleeding problems.  PERIPHERAL VASCULAR:Denies claudication or cyanosis.  MUSCULOSKELETAL: Denies joint stiffness, pain or swelling. Denies edema. Reports previous PCP advised she has CTS and cubital tunnel syndrome with treatment with steroid. Reports no longer with symptoms since weight loss.   NEUROLOGIC: Denies history of seizures, tremors, paralysis, alteration of gait or coordination.  PSYCHIATRIC: Denies mood swings, anxiety and depression, homicidal or suicidal thoughts. Reports sleepiness with increase of Prozac 20 mg to 40 mg daily in July 2023 with initial help with depression and anxiety. Reports not feels depressed but has no "get up and go at least 3 to 4 times per week for a few months."     PE:   VS: /70   Pulse 89   Temp 99 °F (37.2 °C) (Tympanic)   Resp 18   Ht 5' 3" (1.6 m)   Wt 82 kg (180 lb 12.4 oz)   LMP 06/03/2024 Comment: monthly  SpO2 99%   BMI 32.02 kg/m²   APPEARANCE:  Well nourished, well developed female, pleasant and obese, alert and oriented in no acute distress.    HEAD: Nontender. Full range of motion.  EYES: PERRL, conjunctiva pink, lids no edema. She wears glasses.  EARS: External canal patent, no swelling or redness. TM's shiny and clear.  NOSE: Mucosa and turbinates pink, not swollen. No discharge. Nontender sinuses.  THROAT: No pharyngeal erythema " or exudate. No stridor.   NECK: Supple, no mass, thyroid not enlarged.  NODES: No cervical lymph node enlargement.  CHEST: Normal respiratory effort. Lungs clear to auscultation.  CARDIOVASCULAR: Normal S1, S2. No rubs, murmurs or gallops. PMI not displaced. No carotid bruit. Pedal pulses palpable bilaterally. No edema.  ABDOMEN: Bowel sounds present. Not distended. Soft. No tenderness, masses or organomegaly.  BREAST EXAM: Not examined.  PELVIC EXAM: Not examined.  RECTAL EXAM: Not examined.  MUSCULOSKELETAL: No joint deformities or stiffness. She is ambulatory without problems.  SKIN: No rashes or suspicious lesions, normal color and turgor.  NEUROLOGIC:   Cranial Nerves: II-XII grossly intact.   DTR's: Knees, Ankles 2+ and equal bilaterally. Gait & Posture: Normal gait and fine motion.  PSYCHIATRIC:Patient alert, oriented x 3. Mood/Affect normal without acute anxiety or depression noted. Judgment/insight good as she makes appropriate decisions during today's exam.    ASSESSMENT:    ICD-10-CM ICD-9-CM    1. Annual physical exam  Z00.00 V70.0 Lipid Panel      CBC Auto Differential      TSH      Comprehensive Metabolic Panel      Ferritin      Vitamin D      2. Fatigue, unspecified type  R53.83 780.79       3. Anxiety and depression  F41.9 300.00 FLUoxetine 40 MG capsule    F32.A 311       4. Obesity (BMI 30.0-34.9)  E66.9 278.00           PLAN:  1. Age-appropriate counseling-appropriate low-sodium, low-cholesterol, low carbohydrate diet and exercise daily, monthly breast self exam, annual wellness examination.   2. Patient advised to call for results.  3. Continue current medications.  4. Prescription refill as noted above.  5. Keep follow up with specialists.  6. Follow up in about 6 months (around 12/19/2024) for anxiety and depression follow up.

## 2024-06-19 LAB
FERRITIN SERPL-MCNC: 42 NG/ML (ref 20–300)
TSH SERPL DL<=0.005 MIU/L-ACNC: 0.89 UIU/ML (ref 0.4–4)

## 2024-06-21 ENCOUNTER — PATIENT MESSAGE (OUTPATIENT)
Dept: FAMILY MEDICINE | Facility: CLINIC | Age: 34
End: 2024-06-21
Payer: MEDICAID

## 2024-06-21 ENCOUNTER — TELEPHONE (OUTPATIENT)
Dept: FAMILY MEDICINE | Facility: CLINIC | Age: 34
End: 2024-06-21
Payer: MEDICAID

## 2024-06-21 NOTE — TELEPHONE ENCOUNTER
Advise pt  lab results are within acceptable range except borderline low vitamin D. Please continue current medications and take over-the-counter vitamin D 1000 units daily. Thanks for call.

## 2024-07-29 ENCOUNTER — LAB VISIT (OUTPATIENT)
Dept: LAB | Facility: HOSPITAL | Age: 34
End: 2024-07-29
Attending: FAMILY MEDICINE
Payer: MEDICAID

## 2024-07-29 ENCOUNTER — OFFICE VISIT (OUTPATIENT)
Dept: FAMILY MEDICINE | Facility: CLINIC | Age: 34
End: 2024-07-29
Payer: MEDICAID

## 2024-07-29 VITALS
HEIGHT: 63 IN | HEART RATE: 65 BPM | DIASTOLIC BLOOD PRESSURE: 82 MMHG | WEIGHT: 177.5 LBS | BODY MASS INDEX: 31.45 KG/M2 | RESPIRATION RATE: 16 BRPM | TEMPERATURE: 99 F | SYSTOLIC BLOOD PRESSURE: 116 MMHG | OXYGEN SATURATION: 99 %

## 2024-07-29 DIAGNOSIS — Z23 IMMUNIZATION DUE: ICD-10-CM

## 2024-07-29 DIAGNOSIS — Z23 IMMUNIZATION DUE: Primary | ICD-10-CM

## 2024-07-29 DIAGNOSIS — E66.01 SEVERE OBESITY (BMI 35.0-39.9) WITH COMORBIDITY: ICD-10-CM

## 2024-07-29 DIAGNOSIS — Z00.00 PHYSICAL EXAM: ICD-10-CM

## 2024-07-29 LAB — HBV SURFACE AG SERPL QL IA: NORMAL

## 2024-07-29 PROCEDURE — 99999 PR PBB SHADOW E&M-EST. PATIENT-LVL III: CPT | Mod: PBBFAC,,, | Performed by: NURSE PRACTITIONER

## 2024-07-29 PROCEDURE — 3079F DIAST BP 80-89 MM HG: CPT | Mod: CPTII,,, | Performed by: NURSE PRACTITIONER

## 2024-07-29 PROCEDURE — 1159F MED LIST DOCD IN RCRD: CPT | Mod: CPTII,,, | Performed by: NURSE PRACTITIONER

## 2024-07-29 PROCEDURE — 99214 OFFICE O/P EST MOD 30 MIN: CPT | Mod: S$PBB,,, | Performed by: NURSE PRACTITIONER

## 2024-07-29 PROCEDURE — 90471 IMMUNIZATION ADMIN: CPT | Mod: PBBFAC,PO

## 2024-07-29 PROCEDURE — 99999PBSHW TDAP VACCINE GREATER THAN OR EQUAL TO 7YO IM: Mod: PBBFAC,,,

## 2024-07-29 PROCEDURE — 3008F BODY MASS INDEX DOCD: CPT | Mod: CPTII,,, | Performed by: NURSE PRACTITIONER

## 2024-07-29 PROCEDURE — 90715 TDAP VACCINE 7 YRS/> IM: CPT | Mod: PBBFAC,PO

## 2024-07-29 PROCEDURE — 99213 OFFICE O/P EST LOW 20 MIN: CPT | Mod: PBBFAC,PO | Performed by: NURSE PRACTITIONER

## 2024-07-29 PROCEDURE — 99999PBSHW PR PBB SHADOW TECHNICAL ONLY FILED TO HB: Mod: PBBFAC,,,

## 2024-07-29 PROCEDURE — 1160F RVW MEDS BY RX/DR IN RCRD: CPT | Mod: CPTII,,, | Performed by: NURSE PRACTITIONER

## 2024-07-29 PROCEDURE — 3074F SYST BP LT 130 MM HG: CPT | Mod: CPTII,,, | Performed by: NURSE PRACTITIONER

## 2024-07-29 PROCEDURE — 90734 MENACWYD/MENACWYCRM VACC IM: CPT | Mod: PBBFAC,PO

## 2024-07-29 PROCEDURE — 36415 COLL VENOUS BLD VENIPUNCTURE: CPT | Mod: PO | Performed by: NURSE PRACTITIONER

## 2024-07-29 PROCEDURE — 86480 TB TEST CELL IMMUN MEASURE: CPT | Performed by: NURSE PRACTITIONER

## 2024-07-29 PROCEDURE — 87340 HEPATITIS B SURFACE AG IA: CPT | Performed by: NURSE PRACTITIONER

## 2024-07-29 RX ADMIN — MENINGOCOCCAL (GROUPS A, C, Y AND W-135) OLIGOSACCHARIDE DIPHTHERIA CRM197 CONJUGATE VACCINE 0.5 ML: 10; 5; 5; 5 INJECTION, SOLUTION INTRAMUSCULAR at 09:07

## 2024-07-29 NOTE — PROGRESS NOTES
Radames Cota  07/29/2024  [id number]    Elysia Hargrove MD  Patient Care Team:  Elysia Hargrove MD as PCP - General (Family Medicine)  Dodie Hopkins NP as Nurse Practitioner (Family Medicine)          Visit Type:an urgent visit for an existing problem     Chief Complaint:  Chief Complaint   Patient presents with    Annual Exam       History of Present Illness:    32 yo female presents today for school physical.      Denies family history of sudden cardiac death. Patient denies LOC, abdominal pain, fever, rash, heart issues, chest fluttering, history of LOC with exertion, muscle aches, joint pain,  symptoms, GI symptoms, shortness of breath, and chest pain.     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     History:  Past Medical History:   Diagnosis Date    Anxiety     Depression     Preeclampsia     during 3rd and 4th pregnancies     Past Surgical History:   Procedure Laterality Date    CHOLECYSTECTOMY  01/29/2019     Family History   Problem Relation Name Age of Onset    Thyroid disease Mother      Hypertension Mother      Bipolar disorder Father      No Known Problems Sister      No Known Problems Brother      No Known Problems Brother      Diabetes Maternal Grandmother      No Known Problems Daughter      No Known Problems Daughter      No Known Problems Son      SIDS Son      Diabetes Maternal Aunt       Social History     Socioeconomic History    Marital status: Single   Occupational History    Occupation:    Tobacco Use    Smoking status: Never    Smokeless tobacco: Never   Substance and Sexual Activity    Alcohol use: Not Currently    Drug use: Never    Sexual activity: Yes     Partners: Male     Birth control/protection: None   Social History Narrative    She wears seatbelt. 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     Social Determinants of Health     Financial Resource Strain: Low Risk  (6/17/2024)    Overall Financial Resource Strain (CARDIA)      Difficulty of Paying Living Expenses: Not very hard   Food Insecurity: No Food Insecurity (6/17/2024)    Hunger Vital Sign     Worried About Running Out of Food in the Last Year: Never true     Ran Out of Food in the Last Year: Never true   Transportation Needs: No Transportation Needs (7/18/2023)    PRAPARE - Transportation     Lack of Transportation (Medical): No     Lack of Transportation (Non-Medical): No   Physical Activity: Insufficiently Active (6/18/2024)    Exercise Vital Sign     Days of Exercise per Week: 2 days     Minutes of Exercise per Session: 40 min   Stress: Stress Concern Present (6/17/2024)    [employer] - Occupational Stress Questionnaire     Feeling of Stress : Rather much   Housing Stability: Low Risk  (7/18/2023)    Housing Stability Vital Sign     Unable to Pay for Housing in the Last Year: No     Number of Places Lived in the Last Year: 1     Unstable Housing in the Last Year: No     Patient Active Problem List   Diagnosis    Anxiety and depression    Insomnia    Severe obesity (BMI 35.0-39.9) with comorbidity     Review of patient's allergies indicates:  No Known Allergies    The following were reviewed at this visit: active problem list, medication list, allergies, family history, social history, and health maintenance.    Medications:  Current Outpatient Medications on File Prior to Visit   Medication Sig Dispense Refill    FLUoxetine 40 MG capsule Take 1 capsule (40 mg total) by mouth once daily. 90 capsule 2     No current facility-administered medications on file prior to visit.       Medications have been reviewed and reconciled with patient at this visit.  Barriers to medications reviewed with patient.    Adverse reactions to current medications reviewed with patient..    Over the counter medications reviewed and reconciled with patient.    Exam:  Wt Readings from Last 3 Encounters:   07/29/24 80.5 kg (177 lb 7.5 oz)   06/18/24 82 kg (180 lb 12.4 oz)    12/18/23 99.5 kg (219 lb 4 oz)     Temp Readings from Last 3 Encounters:   07/29/24 99.3 °F (37.4 °C) (Tympanic)   06/18/24 99 °F (37.2 °C) (Tympanic)   12/18/23 98.3 °F (36.8 °C) (Oral)     BP Readings from Last 3 Encounters:   07/29/24 116/82   06/18/24 122/70   12/18/23 126/70     Pulse Readings from Last 3 Encounters:   07/29/24 65   06/18/24 89   12/18/23 82     Body mass index is 31.44 kg/m².      Review of Systems   Constitutional:  Negative for fever.   Respiratory:  Negative for cough, shortness of breath and wheezing.    Cardiovascular:  Negative for chest pain and palpitations.   Gastrointestinal:  Negative for nausea.   Neurological:  Negative for speech change, weakness and headaches.   All other systems reviewed and are negative.    Physical Exam  Vitals and nursing note reviewed.   Constitutional:       Appearance: Normal appearance. She is normal weight.   HENT:      Head: Normocephalic and atraumatic.      Right Ear: Tympanic membrane, ear canal and external ear normal.      Left Ear: Tympanic membrane, ear canal and external ear normal.      Nose: Nose normal.      Mouth/Throat:      Mouth: Mucous membranes are moist.      Pharynx: Oropharynx is clear.   Eyes:      Extraocular Movements: Extraocular movements intact.      Conjunctiva/sclera: Conjunctivae normal.      Pupils: Pupils are equal, round, and reactive to light.   Cardiovascular:      Rate and Rhythm: Normal rate and regular rhythm.      Pulses: Normal pulses.      Heart sounds: Normal heart sounds.   Pulmonary:      Effort: Pulmonary effort is normal.      Breath sounds: Normal breath sounds.   Abdominal:      General: Bowel sounds are normal.      Palpations: Abdomen is soft.   Musculoskeletal:         General: Normal range of motion.      Cervical back: Normal range of motion and neck supple.   Skin:     General: Skin is warm and dry.      Capillary Refill: Capillary refill takes less than 2 seconds.   Neurological:      General: No  focal deficit present.      Mental Status: She is alert and oriented to person, place, and time.   Psychiatric:         Mood and Affect: Mood normal.         Behavior: Behavior normal.         Thought Content: Thought content normal.         Judgment: Judgment normal.         Laboratory Reviewed ({Yes)  Lab Results   Component Value Date    WBC 5.83 06/18/2024    HGB 13.1 06/18/2024    HCT 39.3 06/18/2024     06/18/2024    CHOL 190 06/18/2024    TRIG 69 06/18/2024    HDL 42 06/18/2024    ALT 8 (L) 06/18/2024    AST 9 (L) 06/18/2024     06/18/2024    K 3.7 06/18/2024     06/18/2024    CREATININE 0.7 06/18/2024    BUN 3 (L) 06/18/2024    CO2 26 06/18/2024    TSH 0.886 06/18/2024    HGBA1C 5.2 07/06/2023     Radames was seen today for annual exam.    Diagnoses and all orders for this visit:    Immunization due    -     QUANTIFERON GOLD TB; Future  -     HEPATITIS B SURFACE ANTIGEN; Future  -     (In Office Administered) Tdap Vaccine    Physical exam    Severe obesity (BMI 35.0-39.9) with comorbidity         Follow up: Follow up for with  for 6 month follow up.    After visit summary was printed and given to patient upon discharge today.  Patient goals and care plan are included in After Visit Summary.    Plan   TDAP  HEP B titers  TB gold   Meningitis   Physical completed           Care Plan/Goals: Reviewed    Goals    None     Radames was seen today for annual exam.    Diagnoses and all orders for this visit:    Immunization due    -     QUANTIFERON GOLD TB; Future  -     HEPATITIS B SURFACE ANTIGEN; Future  -     (In Office Administered) Tdap Vaccine    Physical exam    Severe obesity (BMI 35.0-39.9) with comorbidity         Follow up: Follow up for with  for 6 month follow up.    After visit summary was printed and given to patient upon discharge today.  Patient goals and care plan are included in After Visit Summary.

## 2024-07-30 LAB
GAMMA INTERFERON BACKGROUND BLD IA-ACNC: 1.72 IU/ML
M TB IFN-G CD4+ BCKGRND COR BLD-ACNC: 0.17 IU/ML
M TB IFN-G CD4+ BCKGRND COR BLD-ACNC: 0.35 IU/ML
MITOGEN IGNF BCKGRD COR BLD-ACNC: 8.28 IU/ML
TB GOLD PLUS: NEGATIVE

## 2024-08-07 ENCOUNTER — PATIENT MESSAGE (OUTPATIENT)
Dept: FAMILY MEDICINE | Facility: CLINIC | Age: 34
End: 2024-08-07
Payer: MEDICAID

## 2024-08-07 DIAGNOSIS — Z23 IMMUNIZATION DUE: Primary | ICD-10-CM

## 2024-08-09 ENCOUNTER — CLINICAL SUPPORT (OUTPATIENT)
Dept: FAMILY MEDICINE | Facility: CLINIC | Age: 34
End: 2024-08-09
Payer: MEDICAID

## 2024-08-09 DIAGNOSIS — Z23 IMMUNIZATION DUE: Primary | ICD-10-CM

## 2024-08-22 ENCOUNTER — PATIENT MESSAGE (OUTPATIENT)
Dept: FAMILY MEDICINE | Facility: CLINIC | Age: 34
End: 2024-08-22
Payer: MEDICAID

## 2024-08-23 ENCOUNTER — LAB VISIT (OUTPATIENT)
Dept: LAB | Facility: HOSPITAL | Age: 34
End: 2024-08-23
Attending: NURSE PRACTITIONER
Payer: MEDICAID

## 2024-08-23 DIAGNOSIS — Z23 IMMUNIZATION DUE: ICD-10-CM

## 2024-08-23 LAB — HBV SURFACE AG SERPL QL IA: NORMAL

## 2024-08-23 PROCEDURE — 36415 COLL VENOUS BLD VENIPUNCTURE: CPT | Mod: PO | Performed by: NURSE PRACTITIONER

## 2024-08-23 PROCEDURE — 87340 HEPATITIS B SURFACE AG IA: CPT | Performed by: NURSE PRACTITIONER

## 2024-08-27 ENCOUNTER — DOCUMENTATION ONLY (OUTPATIENT)
Dept: FAMILY MEDICINE | Facility: CLINIC | Age: 34
End: 2024-08-27
Payer: MEDICAID

## 2024-08-27 DIAGNOSIS — Z01.84 IMMUNITY STATUS TESTING: Primary | ICD-10-CM

## 2024-08-27 NOTE — PROGRESS NOTES
Phoned pt today [phone]  RE:  Hep-B lab, vaccine    She originally came in for appt on 7/29/24 for school clearance and titer for Hep-B.  She was advised that her titer came back negative.  Hep-B vaccine done 8/9/24 and repeat lab done to check titer on 8/23/24.  Both tests negative.  She is wondering what the next step for her would be.    Advised her that the wrong test was ordered both times.  The Hep-B antigen, not antibody, was checked.  So both results negative as she does not have active infection.  Hep-B titer is to be checked and if vaccine needed, then give Heplisav 2 shot series 28 days apart.  Lab was rechecked 2 weeks after vaccine done although titer is to be checked no sooner than 4 to 8 weeks after vaccine series completed.    For now, no further vaccines.  Will check titer ~ 4 weeks after vaccine done which puts it on/after 9/9/24.  Will go from there after titer/antibody checked.    Lab order in.

## 2024-08-29 ENCOUNTER — TELEPHONE (OUTPATIENT)
Dept: FAMILY MEDICINE | Facility: CLINIC | Age: 34
End: 2024-08-29
Payer: MEDICAID

## 2024-08-29 ENCOUNTER — PATIENT MESSAGE (OUTPATIENT)
Dept: FAMILY MEDICINE | Facility: CLINIC | Age: 34
End: 2024-08-29
Payer: MEDICAID

## 2024-08-29 DIAGNOSIS — F32.A ANXIETY AND DEPRESSION: ICD-10-CM

## 2024-08-29 DIAGNOSIS — F41.9 ANXIETY AND DEPRESSION: ICD-10-CM

## 2024-08-29 RX ORDER — FLUOXETINE HYDROCHLORIDE 20 MG/1
20 CAPSULE ORAL DAILY
Qty: 90 CAPSULE | Refills: 0 | Status: SHIPPED | OUTPATIENT
Start: 2024-08-29

## 2024-08-29 NOTE — TELEPHONE ENCOUNTER
I have put the following orders and/or medications to this note.      No orders of the defined types were placed in this encounter.      Medications Ordered This Encounter   Medications    FLUoxetine 20 MG capsule     Sig: Take 1 capsule (20 mg total) by mouth once daily.     Dispense:  90 capsule     Refill:  0

## 2024-08-29 NOTE — TELEPHONE ENCOUNTER
Pt sent the following msg. Please advise. Msg below:    Cory Hargrove!     The past few week Mary noticed that my medication (Prozac) is not working at all. I remember my last visit we discussed minor changes I noticed but those changes have now worsened. I am extremely irritable, cant focus at school, in class, or while studying. I have even started back having uncomfortable impulses (shaking my legs, jittery hands etc.) Is it possible to increase the dosage or should I change medication completely?     I look forward to hearing from you.  Thanks!

## 2024-10-11 ENCOUNTER — LAB VISIT (OUTPATIENT)
Dept: LAB | Facility: HOSPITAL | Age: 34
End: 2024-10-11
Attending: REGISTERED NURSE
Payer: MEDICAID

## 2024-10-11 DIAGNOSIS — Z01.84 IMMUNITY STATUS TESTING: ICD-10-CM

## 2024-10-11 PROCEDURE — 86706 HEP B SURFACE ANTIBODY: CPT | Mod: 91 | Performed by: REGISTERED NURSE

## 2024-10-11 PROCEDURE — 36415 COLL VENOUS BLD VENIPUNCTURE: CPT | Mod: PO | Performed by: REGISTERED NURSE

## 2024-10-12 LAB
HBV SURFACE AB SER-ACNC: 110.16 MIU/ML
HBV SURFACE AB SER-ACNC: REACTIVE M[IU]/ML

## 2024-11-25 DIAGNOSIS — F32.A ANXIETY AND DEPRESSION: ICD-10-CM

## 2024-11-25 DIAGNOSIS — F41.9 ANXIETY AND DEPRESSION: ICD-10-CM

## 2024-11-25 NOTE — TELEPHONE ENCOUNTER
Refill Routing Note   Medication(s) are not appropriate for processing by [facility] for the following reason(s):        New or recently adjusted medication    [facility] action(s):  Defer        Medication Therapy Plan: added 20 mg for a total dose of 60 mg daily on 08/29/2024      Appointments  past 12m or future 3m with PCP    Date Provider   Last Visit   6/18/2024 Elysia Hargrove MD   Next Visit   12/20/2024 Elysia Hargrove MD   ED visits in past 90 days: 0        Note composed:8:45 AM 11/25/2024

## 2024-11-25 NOTE — TELEPHONE ENCOUNTER
No care due was identified.  [facility] Embedded Care Due Messages. Reference number: [id number].   11/25/2024 3:36:25 AM CST

## 2024-11-26 RX ORDER — FLUOXETINE HYDROCHLORIDE 40 MG/1
40 CAPSULE ORAL DAILY
Qty: 90 CAPSULE | Refills: 1 | Status: SHIPPED | OUTPATIENT
Start: 2024-11-26

## 2024-11-26 RX ORDER — FLUOXETINE HYDROCHLORIDE 20 MG/1
CAPSULE ORAL
Qty: 90 CAPSULE | Refills: 1 | Status: SHIPPED | OUTPATIENT
Start: 2024-11-26

## 2024-12-20 ENCOUNTER — OFFICE VISIT (OUTPATIENT)
Dept: FAMILY MEDICINE | Facility: CLINIC | Age: 34
End: 2024-12-20
Attending: FAMILY MEDICINE
Payer: MEDICAID

## 2024-12-20 VITALS
RESPIRATION RATE: 18 BRPM | HEART RATE: 84 BPM | DIASTOLIC BLOOD PRESSURE: 70 MMHG | TEMPERATURE: 99 F | OXYGEN SATURATION: 99 % | BODY MASS INDEX: 33.2 KG/M2 | SYSTOLIC BLOOD PRESSURE: 118 MMHG | WEIGHT: 187.38 LBS | HEIGHT: 63 IN

## 2024-12-20 DIAGNOSIS — F32.A ANXIETY AND DEPRESSION: ICD-10-CM

## 2024-12-20 DIAGNOSIS — M54.50 LUMBAR BACK PAIN: ICD-10-CM

## 2024-12-20 DIAGNOSIS — F41.9 ANXIETY AND DEPRESSION: ICD-10-CM

## 2024-12-20 DIAGNOSIS — E66.811 OBESITY (BMI 30.0-34.9): ICD-10-CM

## 2024-12-20 DIAGNOSIS — M54.31 SCIATICA OF RIGHT SIDE: Primary | ICD-10-CM

## 2024-12-20 PROCEDURE — 99214 OFFICE O/P EST MOD 30 MIN: CPT | Mod: PBBFAC,PO | Performed by: FAMILY MEDICINE

## 2024-12-20 PROCEDURE — 99999 PR PBB SHADOW E&M-EST. PATIENT-LVL IV: CPT | Mod: PBBFAC,,, | Performed by: FAMILY MEDICINE

## 2024-12-20 RX ORDER — IBUPROFEN 800 MG/1
800 TABLET ORAL
Qty: 60 TABLET | Refills: 1 | Status: SHIPPED | OUTPATIENT
Start: 2024-12-20

## 2024-12-20 NOTE — PROGRESS NOTES
Radames Cota    Chief Complaint   Patient presents with    Anxiety    Depression    Follow-up       History of Present Illness:   Ms. Cota comes in today for anxiety and depression follow-up.  She states she is fasting and has taken medication today.  She states she continues to take fluoxetine 40 mg +20 mg daily for anxiety and depression with help but also states she has spiking anxiety when she is testing.  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  She states her grade was 3.7 this semester.  She states she occasionally drinks alcohol but denies tobacco or illicit drug use.  She states she monitors her diet.  She states she has not been exercising due to time restraints while in school.    She complains of having occasional dull back pain of nontraumatic nature for several months.  She states she was evaluated at ER on October 29, 2024 for right sciatica with normal low back x-ray and prescribed Flexeril and ibuprofen with help.  She states lately (4 months) she has been experiencing pain at her right anterior shin after taking a bath with now daily pain with tingling and numbness sensation in possible bump at the area for few days but states bump is resolving.  She states she takes ibuprofen inconsistently with help.  She states pain is 6-7/10 on pain scale at worst.    Otherwise, she denies having fever, chills, fatigue, appetite changes; shortness of breath, cough, wheezing; chest pain, palpitations, leg swelling; abdominal pain, nausea, vomiting, diarrhea, constipation; unusual urinary symptoms; polydipsia, polyphagia, polyuria, hot or cold intolerance; headache; anxiety, depression, homicidal or suicidal thoughts.        Labs:           WBC                      5.83                06/18/2024                 HGB                      13.1                06/18/2024                 HCT                      39.3                 06/18/2024                 PLT                      254                 06/18/2024                 CHOL                     190                 06/18/2024                 TRIG                     69                  06/18/2024                 HDL                      42                  06/18/2024                 ALT                      8 (L)               06/18/2024                 AST                      9 (L)               06/18/2024                 NA                       138                 06/18/2024                 K                        3.7                 06/18/2024                 CL                       106                 06/18/2024                 CREATININE               0.7                 06/18/2024                 BUN                      3 (L)               06/18/2024                 CO2                      26                  06/18/2024                 TSH                      0.886               06/18/2024                 HGBA1C                   5.2                 07/06/2023              LDLCALC                  134.2               06/18/2024                  Current Outpatient Medications   Medication Sig    FLUoxetine 20 MG capsule TAKE 1 CAPSULE(20 MG) BY MOUTH DAILY    FLUoxetine 40 MG capsule Take 1 capsule (40 mg total) by mouth once daily.       Review of Systems   Constitutional:  Negative for activity change, appetite change, chills, fatigue and fever.        Wt 82 kg (180 lb 12.4 oz) at June 18, 2024 visit.   Respiratory:  Negative for cough, shortness of breath and wheezing.    Cardiovascular:  Negative for chest pain, palpitations and leg swelling.   Gastrointestinal:  Negative for abdominal pain, constipation, diarrhea, nausea and vomiting.   Endocrine: Negative for cold intolerance, heat intolerance, polydipsia, polyphagia and polyuria.   Genitourinary:  Negative for difficulty urinating.   Musculoskeletal:  Positive for back pain and myalgias.    Neurological:  Positive for numbness. Negative for headaches.   Psychiatric/Behavioral:  Negative for dysphoric mood and suicidal ideas. The patient is not nervous/anxious.         Negative for homicidal ideas. See history of present illness.       Objective:  Physical Exam  Vitals reviewed.   Constitutional:       General: She is not in acute distress.     Appearance: Normal appearance. She is obese. She is not ill-appearing, toxic-appearing or diaphoretic.      Comments: Pleasant.   Cardiovascular:      Rate and Rhythm: Normal rate and regular rhythm.      Pulses: Normal pulses.      Heart sounds: No murmur heard.  Pulmonary:      Effort: Pulmonary effort is normal. No respiratory distress.      Breath sounds: Normal breath sounds. No wheezing.   Abdominal:      General: Bowel sounds are normal. There is no distension.      Palpations: Abdomen is soft. There is no mass.      Tenderness: There is no abdominal tenderness. There is no guarding or rebound.   Musculoskeletal:         General: Tenderness present. No swelling. Normal range of motion.      Cervical back: Normal range of motion and neck supple. No tenderness.      Comments: She is ambulatory without problems. Non tender low back with full range of motion noted.  Slightly tender at right anterior shin without localized swelling noted.  Negative Betsy's sign at right leg.   Lymphadenopathy:      Cervical: No cervical adenopathy.   Skin:     General: Skin is warm.   Neurological:      General: No focal deficit present.      Mental Status: She is alert and oriented to person, place, and time.   Psychiatric:         Mood and Affect: Mood normal.         Behavior: Behavior normal.         Thought Content: Thought content normal.         Judgment: Judgment normal.         ASSESSMENT:  1. Sciatica of right side    2. Lumbar back pain    3. Anxiety and depression    4. Obesity (BMI 30.0-34.9)        PLAN:  Radames was seen today for anxiety, depression and  follow-up.    Diagnoses and all orders for this visit:    Sciatica of right side  -     Ambulatory referral/consult to Physical/Occupational Therapy; Future  -     ibuprofen (ADVIL,MOTRIN) 800 MG tablet; Take 1 tablet (800 mg total) by mouth 2 (two) times daily before meals.    Lumbar back pain  -     Ambulatory referral/consult to Physical/Occupational Therapy; Future  -     ibuprofen (ADVIL,MOTRIN) 800 MG tablet; Take 1 tablet (800 mg total) by mouth 2 (two) times daily before meals.    Anxiety and depression - stable on medication    Obesity (BMI 30.0-34.9)      Patient advised to call for results.  Continue current medications, follow low sodium, low cholesterol, low carb diet, daily walks.  Ibuprofen 800 mg twice daily with food for at least 2 weeks, then prn; edication precautions discussed with patient.  Keep follow up with specialists.  Follow up in about 6 months (around 6/19/2025) for physical.

## 2024-12-30 ENCOUNTER — CLINICAL SUPPORT (OUTPATIENT)
Dept: REHABILITATION | Facility: HOSPITAL | Age: 34
End: 2024-12-30
Payer: MEDICAID

## 2024-12-30 DIAGNOSIS — M54.31 SCIATICA OF RIGHT SIDE: ICD-10-CM

## 2024-12-30 DIAGNOSIS — R53.1 WEAKNESS: ICD-10-CM

## 2024-12-30 DIAGNOSIS — R52 PAIN: Primary | ICD-10-CM

## 2024-12-30 DIAGNOSIS — R29.3 POSTURE IMBALANCE: ICD-10-CM

## 2024-12-30 DIAGNOSIS — M54.50 LUMBAR BACK PAIN: ICD-10-CM

## 2024-12-30 PROCEDURE — 97161 PT EVAL LOW COMPLEX 20 MIN: CPT | Mod: PN

## 2024-12-30 PROCEDURE — 97110 THERAPEUTIC EXERCISES: CPT | Mod: PN

## 2024-12-30 NOTE — PLAN OF CARE
[facility]   Physical Therapy Initial Evaluation        Name: Radames Cota  Clinic Number: [id number]    Therapy Diagnosis:   Encounter Diagnoses   Name Primary?    Sciatica of right side     Lumbar back pain     Pain Yes    Posture imbalance     Weakness      Physician: Elysia Hargrove MD    Physician Orders: PT Eval and Treat   Medical Diagnosis from Referral: Sciatica of right side   Lumbar back pain  Evaluation Date: 12/30/2024  Authorization Period Expiration: 12/31/2025  Plan of Care Expiration: 3/7/2025  Progress Note Due: 30 days  Visit # / Visits authorized: 1/ 1   FOTO: 1/3  Precautions: Standard, Standard    Time In: 2:06  Time Out: 3:00  Total Billable Time: 54 minutes (low Complexity Evaluation, Therapeutic Exercise - 34, Manual Therapy - 0, Therapeutic activities- 0, Neuro muscular re education- 0)      Subjective   Date of onset: 6 years ago started having pain during her pregnancy and has had flare ups ever since  History of current condition - Radames reports: her last flare up was on 12/20/24. Patient reports she has back pain daily. Patient reports intermittent R LE pain down to her foot and reports she started to have numbness and tingling in her toes with this last flare up.     ER on October 29, 2024 for right sciatica with normal low back x-ray and prescribed Flexeril and ibuprofen for pain management with no relief     Medical History:   Past Medical History:   Diagnosis Date    Anxiety     Depression     Preeclampsia     during 3rd and 4th pregnancies       Surgical History:   Radames Cota  has a past surgical history that includes Cholecystectomy (01/29/2019).    Medications:   Radames has a current medication list which includes the following prescription(s): fluoxetine, fluoxetine, and ibuprofen.    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 positioning  Easing Factors: rest and elevation, supine or L SL    Pts goals: manage pain and HEP    Objective     Posture: R hand dominant, patient wearing slides with no support, decrease medial longitudinal arches, forward head, rounding shoulders, APT, genu recurvatum  Palpation: TTP R piriformis musculature  Sensation: R LE radicular symptoms at times with numbness and tingling in R foot/ toes    Range of Motion/Strength:     Thoracolumbar AROM Pain/Dysfunction with Movement   Flexion WNL's Discomfort at end range   Extension WFL's Repeated extension felt better after 1st attempt/ no radicular pain   Right side bending Past joint line Shooting pain mid low back   Left side bending Past joint line    Right rotation WNL's    Left rotation WNL's          L/E MMT Right Left Pain/Dysfunction with Movement   Hip Flexion 5/5 5/5    Hip Extension 3+/5 4/5 Painful with R   Hip Abduction 5/5 5/5    Hip Adduction 5/5 5/5    Knee Flexion 5/5 5/5    Knee Extension 5/5 5/5    Ankle DF 5/5 5/5    Ankle PF 5/5 5/5    Big Toe Extension 5/5 5/5          Flexibility: mild hamstring tightness and discomfort with stretching R hamstring and piriformis     Special Tests: SLR (-), held shoulders and head off of table for 16 secs      Other: 6 sit to stands in 30 secs with no UE support      Intake Outcome Measure for FOTO lumbar Survey    Therapist reviewed FOTO scores for Radames Cota on 12/30/2024.   FOTO documents entered into [employer] - see Media section.    Intake Score: 48%       TREATMENT   Treatment Time In: 2:06  Treatment Time Out: 3:00  Total Treatment time separate from Evaluation:  34 minutes    Radames received therapeutic exercises to develop strength, endurance, ROM, flexibility, posture, and core stabilization for 34 minutes including:  Pelvic tilts with TA  Hamstring stretch with strap  Piriformis stretch with strap  -Posture education in sitting using a pillow to facilitate upright posture/ and standing to soften knees to put pelvis in neutral position and to engage hip and core musculature  -Benefits of wearing good supportive footwear discussed  -sit to stands  - FOTO completed      Home Exercises Provided and Patient Education Provided       Education/Self-Care provided:   Patient educated on the impairments noted above and the effects of physical therapy intervention to improve overall condition and QOL.   Patient was educated on all the above exercise prior/during/after for proper posture, positioning, and execution for safe performance with home exercise program.   Patient educated on postural awareness and the use of a lumbar roll when in a seated position to reduce stress and maintain optimal alignment of the spine.     Written Home Exercises Provided: yes.  Exercises were reviewed and Radames was able to demonstrate them prior to the end of the session.  Radames demonstrated good  understanding of the education provided.     See EMR under Patient Instructions for exercises provided 12/30/2024.      Assessment   Radames is a 34 y.o. female referred to outpatient Physical Therapy with a medical diagnosis of Sciatica of right side and  Lumbar back pain. Pt presents with pain, weakness, and posture deficits. Patient would benefit from skilled PT interventions for pain management, ROM/ flexibility, posture education/ retraining, and strengthening to improve quality of life.     Pt prognosis is Good.   Pt will benefit from skilled outpatient Physical Therapy to address the deficits stated above and in the chart below, provide pt/family education, and to maximize pt's level of independence.      Plan of care discussed with patient: Yes  Pt's spiritual, cultural and educational needs considered and patient is agreeable to the plan of care and goals as stated below:     Anticipated Barriers for therapy: none       Medical Necessity is demonstrated by the following  History  Co-morbidities and personal factors that may impact the plan of care [x] LOW: no personal factors / co-morbidities  [] MODERATE: 1-2 personal factors / co-morbidities  [] HIGH: 3+ personal factors / co-morbidities     Moderate / High Support Documentation:   Co-morbidities affecting plan of care:      Personal Factors:   no deficits      Examination  Body Structures and Functions, activity limitations and participation restrictions that may impact the plan of care [x] LOW: addressing 1-2 elements  [] MODERATE: 3+ elements  [] HIGH: 4+ elements (please support below)     Moderate / High Support Documentation:       Clinical Presentation [x] LOW: stable  [] MODERATE: Evolving  [] HIGH: Unstable      Decision Making/ Complexity Score: low         Goals:  STG's 2 weeks  Patient will be independent with 50% of HEP    LTG's 10 weeks  Patient will improve FOTO functional measure score  to 57 % in order to improve overall QOL & return to PLOF   2. Patient will report an overall decrease in pain with ADL's and functional mobility  3. Patient will increase strength by at least 1/2 muscle grade in affected musculature to   improve functional mobility  4. Patient will improve strength and endurance and perform 10 sit to stands in 30 secs with no UE support  5. Patient will be more aware of posture throughout the day to reduce stress  and maintain optimal alignment of the spine  6. Patient will be independent with HEP and pain management  7. Patient will demonstrate improved core strength and will hold head and shoulders off of table for 30 secs or more with no difficulty  Plan   Plan of care Certification: 12/30/2024 to 3/7/2025.    Outpatient  Physical Therapy 2 times weekly for 10 weeks to include the following interventions: Cervical/Lumbar Traction, Electrical Stimulation PRN, Manual Therapy, Moist Heat/ Ice, Neuromuscular Re-ed, Patient Education, Self Care, Therapeutic Activities, Therapeutic Exercise, and Ultrasound, ASTYM, Kinesiotaping PRN, Functional Dry Needling      Luz Lindquist, PT         I CERTIFY THE NEED FOR THESE SERVICES FURNISHED UNDER THIS PLAN OF TREATMENT AND WHILE UNDER MY CARE   Physician's comments:     Physician's Signature: ___________________________________________________

## 2025-01-02 NOTE — PROGRESS NOTES
[facility]   Physical Therapy Treatment Note      Name: Radames Cota  Clinic Number: [id number]    Therapy Diagnosis:   Encounter Diagnoses   Name Primary?    Pain Yes    Posture imbalance     Weakness      Physician: Elysia Hargrove MD    Visit Date: 1/3/2025    Physician Orders: PT Eval and Treat   Medical Diagnosis from Referral: Sciatica of right side   Lumbar back pain  Evaluation Date: 12/30/2024  Authorization Period Expiration: 12/31/2025  Plan of Care Expiration: 3/7/2025  Progress Note Due: 30 days  Visit # / Visits authorized: 1/ 20 + eval  FOTO: 1/3  Precautions: Standard     PTA Visit #: 0/5     Time In: 9:02  Time Out: 9:56  Total Billable Time: 54 minutes    Subjective     Pt reports: she is feeling better. Patient brought her yoga strap in that she bought.   She was compliant with home exercise program.  Response to previous treatment: good  Functional change: n/a at this time    Pain: 0/10  Location:  none     Objective      Objective Measures updated at progress report unless specified.     Treatment     Radames received the following treatments:    Radames received the following manual therapy techniques applied for (0) minutes, including:      Radames received therapeutic exercises to develop strength, endurance, ROM, flexibility, posture, and core stabilization for (54)  minutes including:  Recumbent bike for ROM, strength, and endurance x 7 minutes R:2  Incline gastroc stretch 3 x 30 secs  Seated long sitting hamstring stretch 3 x 30 secs B  Seated piriformis stretch B 3 x 30 secs  Seated sciatic nerve glide R 2 x 10 reps  Standing hip extension 3 x 10 reps  Core lumbar machine 30# 3 x 10 reps  Sit to stands with core engaged 3 x 10 reps with 5#  Rows on cables with core engaged 20# 3 x 10 reps  Pal offs on cables with core engaged 30# 3 x 10 reps      Patient Education and Home Exercises       Education provided:   - HEP reviewed    Written Home Exercises Provided: Patient  instructed to cont prior HEP. Exercises were reviewed and Radames was able to demonstrate them prior to the end of the session.  Radames demonstrated good  understanding of the education provided. See EMR under Patient Instructions for exercises provided during therapy sessions    Assessment     First visit after initial evaluation. She tolerated new exercises well. She is learning how to engage core musculature. Patient was tired after the session. PT will progress patient as tolerated.     Radames Is progressing well towards her goals.   Pt prognosis is Good.     Pt will continue to benefit from skilled outpatient physical therapy to address the deficits listed in the problem list box on initial evaluation, provide pt/family education and to maximize pt's level of independence in the home and community environment.     Pt's spiritual, cultural and educational needs considered and pt agreeable to plan of care and goals.     Anticipated barriers to physical therapy: none    Goals: STG's 2 weeks  Patient will be independent with 50% of HEP Progressing     LTG's 10 weeks  Patient will improve FOTO functional measure score  to 57 % in order to improve overall QOL & return to PLOF   2. Patient will report an overall decrease in pain with ADL's and functional mobility  3. Patient will increase strength by at least 1/2 muscle grade in affected musculature to improve functional mobility  4. Patient will improve strength and endurance and perform 10 sit to stands in 30 secs with no UE support  5. Patient will be more aware of posture throughout the day to reduce stress  and maintain optimal alignment of the spine  6. Patient will be independent with HEP and pain management  7. Patient will demonstrate improved core strength and will hold head and shoulders off of table for 30 secs or more with no difficulty    Plan     Plan of care Certification: 12/30/2024 to 3/7/2025.     Outpatient Physical Therapy 2 times weekly for 10 weeks  to include the following interventions: Cervical/Lumbar Traction, Electrical Stimulation PRN, Manual Therapy, Moist Heat/ Ice, Neuromuscular Re-ed, Patient Education, Self Care, Therapeutic Activities, Therapeutic Exercise, and Ultrasound, ASTYM, Kinesiotaping PRN, Functional Dry Needling    Luz Lindquist, PT

## 2025-01-03 ENCOUNTER — CLINICAL SUPPORT (OUTPATIENT)
Dept: REHABILITATION | Facility: HOSPITAL | Age: 35
End: 2025-01-03
Payer: MEDICAID

## 2025-01-03 DIAGNOSIS — R29.3 POSTURE IMBALANCE: ICD-10-CM

## 2025-01-03 DIAGNOSIS — R53.1 WEAKNESS: ICD-10-CM

## 2025-01-03 DIAGNOSIS — R52 PAIN: Primary | ICD-10-CM

## 2025-01-03 PROCEDURE — 97110 THERAPEUTIC EXERCISES: CPT | Mod: PN

## 2025-04-03 ENCOUNTER — TELEPHONE (OUTPATIENT)
Dept: FAMILY MEDICINE | Facility: CLINIC | Age: 35
End: 2025-04-03
Payer: MEDICAID

## 2025-04-03 ENCOUNTER — PATIENT MESSAGE (OUTPATIENT)
Dept: FAMILY MEDICINE | Facility: CLINIC | Age: 35
End: 2025-04-03
Payer: MEDICAID

## 2025-04-03 NOTE — TELEPHONE ENCOUNTER
"Portal msg     "Cory Hargrove.     I am currently in my period and against my better judgment I used a new type of pads (organic pads).  I have since been experiencing vaginal itching and irritation. I am pretty sure that it is a yeast infection.  Is there anyway that diflucan can be prescribed?  The [facility] that [facility] use [facility] [address] of [address].      Thanks!"  "

## 2025-04-04 ENCOUNTER — PATIENT MESSAGE (OUTPATIENT)
Dept: FAMILY MEDICINE | Facility: CLINIC | Age: 35
End: 2025-04-04

## 2025-04-04 ENCOUNTER — TELEPHONE (OUTPATIENT)
Dept: FAMILY MEDICINE | Facility: CLINIC | Age: 35
End: 2025-04-04

## 2025-04-04 ENCOUNTER — E-VISIT (OUTPATIENT)
Dept: FAMILY MEDICINE | Facility: CLINIC | Age: 35
End: 2025-04-04
Attending: FAMILY MEDICINE
Payer: MEDICAID

## 2025-04-04 DIAGNOSIS — N76.0 ACUTE VAGINITIS: Primary | ICD-10-CM

## 2025-04-04 PROCEDURE — 99421 OL DIG E/M SVC 5-10 MIN: CPT | Mod: ,,, | Performed by: FAMILY MEDICINE

## 2025-04-08 ENCOUNTER — TELEPHONE (OUTPATIENT)
Dept: FAMILY MEDICINE | Facility: CLINIC | Age: 35
End: 2025-04-08
Payer: MEDICAID

## 2025-04-08 RX ORDER — FLUCONAZOLE 150 MG/1
150 TABLET ORAL DAILY
Qty: 1 TABLET | Refills: 0 | Status: SHIPPED | OUTPATIENT
Start: 2025-04-08 | End: 2025-04-09

## 2025-04-08 NOTE — PROGRESS NOTES
Patient ID: Radames Cota is a 34 y.o. female.    Chief Complaint: General Illness (Entered automatically based on patient selection in [facility].)    The patient initiated a request through [facility] on 4/4/2025 for evaluation and management with a chief complaint of General Illness (Entered automatically based on patient selection in [facility].)     I evaluated the questionnaire submission on 4/8/2025.    [facility] Peq Evisit [facility]    4/4/2025  8:45 AM CDT - Filed by Patient   What do you need help with? Vaginal Symptoms   Do you agree to participate in an E-Visit? Yes   If you have any of the following symptoms,  please do not complete an E-Visit,  schedule an appointment with your provider: I acknowledge   Do you have any of the following pregnancy-related conditions? None   What is the main issue you would like addressed today? Yeast Infection Symptoms   Which of the following vaginal concerns do you have? Itching   Do you have vaginal discharge? No discharge   Do you have pain while passing urine? No   Do you have any of the following symptoms? None of the above    Have you taken antibiotics in the last two weeks? No    Do you use any of the following? Bubble baths   Which of the following applies to your menstrual period? Have now   Which of the following applies to your menstrual cycle? Normal amount of bleeding   Do you have spotting between periods? No   Do you have pain with your period? No   Have you had similar symptoms in the past? More than once   When you had similar symptoms in the past, did any of the following work? Pills for yeast infection   Have you had a temperature of 100.4 or higher? No   Provide any additional information you feel is important. None   Please attach any relevant images or files    Are you able to take your vital signs? Yes   Systolic Blood Pressure: 123   Diastolic Blood Pressure: 80   Weight: 185   Height: 63   Pulse: 78   Temperature: 97.9   Respiration rate: 16    Pulse Oxygen: 99         Encounter Diagnosis   Name Primary?    Acute vaginitis Yes        No orders of the defined types were placed in this encounter.     Medications Ordered This Encounter   Medications    fluconazole (DIFLUCAN) 150 MG Tab     Sig: Take 1 tablet (150 mg total) by mouth once daily. for 1 day     Dispense:  1 tablet     Refill:  0        Follow up if symptoms worsen or fail to improve.      E-Visit Time Trackin minutes    Day 1 Time (in minutes): 5    Total Time (in minutes): 5              Length To Time In Minutes Device Was In Place: 10

## 2025-06-05 ENCOUNTER — TELEPHONE (OUTPATIENT)
Dept: FAMILY MEDICINE | Facility: CLINIC | Age: 35
End: 2025-06-05
Payer: MEDICAID

## 2025-07-14 ENCOUNTER — TELEPHONE (OUTPATIENT)
Dept: FAMILY MEDICINE | Facility: CLINIC | Age: 35
End: 2025-07-14
Payer: MEDICAID

## 2025-07-14 NOTE — TELEPHONE ENCOUNTER
Hamlet Hargrove.  Is it possible to get a tb blood test done? The test is needed for school and the previous test will  soon.     Thanks

## 2025-07-21 ENCOUNTER — PATIENT MESSAGE (OUTPATIENT)
Dept: OBSTETRICS AND GYNECOLOGY | Facility: CLINIC | Age: 35
End: 2025-07-21
Payer: MEDICAID

## 2025-07-24 ENCOUNTER — PATIENT MESSAGE (OUTPATIENT)
Dept: FAMILY MEDICINE | Facility: CLINIC | Age: 35
End: 2025-07-24
Payer: MEDICAID

## 2025-07-25 ENCOUNTER — TELEPHONE (OUTPATIENT)
Dept: FAMILY MEDICINE | Facility: CLINIC | Age: 35
End: 2025-07-25
Payer: MEDICAID

## 2025-07-29 ENCOUNTER — TELEPHONE (OUTPATIENT)
Dept: FAMILY MEDICINE | Facility: CLINIC | Age: 35
End: 2025-07-29
Payer: MEDICAID

## 2025-08-06 ENCOUNTER — TELEPHONE (OUTPATIENT)
Dept: FAMILY MEDICINE | Facility: CLINIC | Age: 35
End: 2025-08-06
Payer: MEDICAID

## 2025-08-06 NOTE — TELEPHONE ENCOUNTER
Copied from CRM #[id number]. Topic: Appointments - Appointment Access  >> Aug 6, 2025  7:59 AM Daniel wrote:  .Type: Appt Access       Who called:   pt     What is the request in detail:    Called in to get scheduled for a tb test . Please call back  Can the clinic reply by [facility]?           Would the patient rather a call back or a response via [facility]?   call     Best call back number:.[phone]

## 2025-08-06 NOTE — TELEPHONE ENCOUNTER
Called patient back. Patient needed an appointment for a TB test. Appointment made with WINSOME Haines.

## 2025-08-08 ENCOUNTER — LAB VISIT (OUTPATIENT)
Dept: LAB | Facility: HOSPITAL | Age: 35
End: 2025-08-08
Payer: MEDICAID

## 2025-08-08 ENCOUNTER — OFFICE VISIT (OUTPATIENT)
Dept: FAMILY MEDICINE | Facility: CLINIC | Age: 35
End: 2025-08-08
Payer: MEDICAID

## 2025-08-08 VITALS
TEMPERATURE: 99 F | DIASTOLIC BLOOD PRESSURE: 76 MMHG | HEART RATE: 83 BPM | BODY MASS INDEX: 32.86 KG/M2 | WEIGHT: 185.44 LBS | SYSTOLIC BLOOD PRESSURE: 100 MMHG | OXYGEN SATURATION: 100 % | HEIGHT: 63 IN

## 2025-08-08 DIAGNOSIS — E66.811 OBESITY (BMI 30.0-34.9): ICD-10-CM

## 2025-08-08 DIAGNOSIS — Z11.1 SCREENING-PULMONARY TB: ICD-10-CM

## 2025-08-08 DIAGNOSIS — Z11.1 SCREENING-PULMONARY TB: Primary | ICD-10-CM

## 2025-08-08 PROCEDURE — 99999 PR PBB SHADOW E&M-EST. PATIENT-LVL III: CPT | Mod: PBBFAC,,,

## 2025-08-08 PROCEDURE — 99213 OFFICE O/P EST LOW 20 MIN: CPT | Mod: PBBFAC,PO

## 2025-08-08 PROCEDURE — 86480 TB TEST CELL IMMUN MEASURE: CPT

## 2025-08-08 PROCEDURE — 36415 COLL VENOUS BLD VENIPUNCTURE: CPT | Mod: PO

## 2025-08-08 NOTE — PROGRESS NOTES
Subjective      Radames Cota  08/08/2025  [id number]    Elysia Hargrove MD  Patient Care Team:  Elysia Hargrove MD as PCP - General (Family Medicine)  Dodie Hopkins NP (Inactive) as Nurse Practitioner (Family Medicine)    Chief Complaint:  Chief Complaint   Patient presents with    TB test       History of Present Illness:    Ms. Cota presents today for TB screening. 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  Denies any current TB-related symptoms or known exposure. She has no  other complaints or concerns today.       Review of Systems  Review of Systems   Constitutional:  Negative for chills, fatigue, fever and night sweats.   Respiratory:  Negative for cough and shortness of breath.    Cardiovascular:  Negative for chest pain.   Gastrointestinal:  Negative for nausea and vomiting.        History:  Past Medical History:   Diagnosis Date    Anxiety     Depression     Preeclampsia     during 3rd and 4th pregnancies     Past Surgical History:   Procedure Laterality Date    CHOLECYSTECTOMY  01/29/2019     Family History   Problem Relation Name Age of Onset    Thyroid disease Mother      Hypertension Mother      Bipolar disorder Father      No Known Problems Sister      No Known Problems Brother      No Known Problems Brother      Diabetes Maternal Grandmother      No Known Problems Daughter      No Known Problems Daughter      No Known Problems Son      SIDS Son      Diabetes Maternal Aunt       Social History[1]     Review of patient's allergies indicates:  No Known Allergies    **The following were reviewed at this visit: active problem list, medication list, allergies, family history, social history, and health maintenance.    Medications:  Medications Ordered Prior to Encounter[2]    **Medications have been reviewed and reconciled with patient at this visit.          Objective       Vitals:    08/08/25 1417   BP: 100/76   Pulse: 83   Temp: 99.3 °F (37.4 °C)       Body mass index is 32.84 kg/m².    Wt Readings from Last 3 Encounters:   08/08/25 84.1 kg (185 lb 6.5 oz)   12/20/24 85 kg (187 lb 6.3 oz)   07/29/24 80.5 kg (177 lb 7.5 oz)     Temp Readings from Last 3 Encounters:   08/08/25 99.3 °F (37.4 °C) (Temporal)   12/20/24 98.7 °F (37.1 °C) (Tympanic)   07/29/24 99.3 °F (37.4 °C) (Tympanic)     BP Readings from Last 3 Encounters:   08/08/25 100/76   12/20/24 118/70   07/29/24 116/82     Pulse Readings from Last 3 Encounters:   08/08/25 83   12/20/24 84   07/29/24 65         Laboratory Reviewed ({Yes)  Lab Results   Component Value Date    WBC 5.83 06/18/2024    HGB 13.1 06/18/2024    HCT 39.3 06/18/2024     06/18/2024    CHOL 190 06/18/2024    TRIG 69 06/18/2024    HDL 42 06/18/2024    ALT 8 (L) 06/18/2024    AST 9 (L) 06/18/2024     06/18/2024    K 3.7 06/18/2024     06/18/2024    CREATININE 0.7 06/18/2024    BUN 3 (L) 06/18/2024    CO2 26 06/18/2024    TSH 0.886 06/18/2024    HGBA1C 5.2 07/06/2023       Physical Exam  Vitals reviewed.   Constitutional:       General: She is not in acute distress.     Appearance: Normal appearance. She is not ill-appearing.   Cardiovascular:      Rate and Rhythm: Normal rate and regular rhythm.      Pulses: Normal pulses.      Heart sounds: Normal heart sounds.   Pulmonary:      Effort: Pulmonary effort is normal.      Breath sounds: Normal breath sounds.   Abdominal:      General: Bowel sounds are normal.      Palpations: Abdomen is soft.   Musculoskeletal:         General: Normal range of motion.   Skin:     General: Skin is warm.   Neurological:      General: No focal deficit present.      Mental Status: She is alert and oriented to person, place, and time.   Psychiatric:         Mood and Affect: Mood normal.         Behavior: Behavior normal.         Thought Content: Thought content normal.         Judgment: Judgment normal.              Assessment and Plan      1. Screening-pulmonary TB  -     Quantiferon Gold  TB; Future; Expected date: 08/08/2025    2. Obesity (BMI 30.0-34.9)         PLAN:  - Ordered TB blood test.  - Ms. Cota instructed to check Consult [facility] portal for TB test results.   - continue all other present management  - follow up with PCP as advised          Follow up: Follow up if symptoms worsen or fail to improve.    **After visit summary was printed and given to patient upon discharge today.  Patient goals and care plan are included in After Visit Summary.    I have spent a total of 15 minutes on this visit. This includes face to face time and non-face to face time preparing to see the patient (eg, review of tests), obtaining and/or reviewing separately obtained history, documenting clinical information in the electronic or other health record, independently interpreting results and communicating results to the patient/family/caregiver, or care coordinator.            Daniela Haines, MSN, APRN, FNP-C         [1]   Social History  Socioeconomic History    Marital status: Single   Occupational History    Occupation:    Tobacco Use    Smoking status: Never    Smokeless tobacco: Never   Substance and Sexual Activity    Alcohol use: Yes     Comment: occ    Drug use: Never    Sexual activity: Yes     Partners: Male     Birth control/protection: None   Social History Narrative    She wears seatbelt. 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     Social Drivers of Health     Financial Resource Strain: Low Risk  (6/17/2024)    Overall Financial Resource Strain (CARDIA)     Difficulty of Paying Living Expenses: Not very hard   Food Insecurity: No Food Insecurity (6/17/2024)    Hunger Vital Sign     Worried About Running Out of Food in the Last Year: Never true     Ran Out of Food in the Last Year: Never true   Transportation Needs: No Transportation Needs (7/18/2023)    PRAPARE - Transportation     Lack of Transportation (Medical): No     Lack of Transportation (Non-Medical): No   Physical  Activity: Inactive (12/20/2024)    Exercise Vital Sign     Days of Exercise per Week: 0 days     Minutes of Exercise per Session: 0 min   Stress: Stress Concern Present (6/17/2024)    [employer] [place] [employer] - Occupational Stress Questionnaire     Feeling of Stress : Rather much   Housing Stability: Low Risk  (7/18/2023)    Housing Stability Vital Sign     Unable to Pay for Housing in the Last Year: No     Number of Places Lived in the Last Year: 1     Unstable Housing in the Last Year: No   [2]   Current Outpatient Medications on File Prior to Visit   Medication Sig Dispense Refill    FLUoxetine 20 MG capsule TAKE 1 CAPSULE(20 MG) BY MOUTH DAILY 90 capsule 1    FLUoxetine 40 MG capsule Take 1 capsule (40 mg total) by mouth once daily. 90 capsule 1    ibuprofen (ADVIL,MOTRIN) 800 MG tablet Take 1 tablet (800 mg total) by mouth 2 (two) times daily before meals. (Patient not taking: Reported on 8/8/2025) 60 tablet 1     No current facility-administered medications on file prior to visit.

## 2025-08-11 LAB
MITOGEN MINUS NIL (OHS): 9.99
NIL TB SYNCED (OHS): 0.01
QUANTIFERON GOLD INTERP (OHS): NEGATIVE
TB1 AG MINUS NIL (OHS): 0
TB2 AG MINUS NIL (OHS): <0